# Patient Record
Sex: FEMALE | Race: BLACK OR AFRICAN AMERICAN | NOT HISPANIC OR LATINO
[De-identification: names, ages, dates, MRNs, and addresses within clinical notes are randomized per-mention and may not be internally consistent; named-entity substitution may affect disease eponyms.]

---

## 2019-01-09 ENCOUNTER — APPOINTMENT (OUTPATIENT)
Dept: ANTEPARTUM | Facility: CLINIC | Age: 24
End: 2019-01-09
Payer: COMMERCIAL

## 2019-01-09 ENCOUNTER — ASOB RESULT (OUTPATIENT)
Age: 24
End: 2019-01-09

## 2019-01-09 PROCEDURE — 76817 TRANSVAGINAL US OBSTETRIC: CPT

## 2019-02-13 ENCOUNTER — APPOINTMENT (OUTPATIENT)
Age: 24
End: 2019-02-13
Payer: COMMERCIAL

## 2019-02-13 ENCOUNTER — ASOB RESULT (OUTPATIENT)
Age: 24
End: 2019-02-13

## 2019-02-13 PROCEDURE — 76813 OB US NUCHAL MEAS 1 GEST: CPT

## 2019-04-10 ENCOUNTER — APPOINTMENT (OUTPATIENT)
Age: 24
End: 2019-04-10

## 2019-04-11 ENCOUNTER — APPOINTMENT (OUTPATIENT)
Age: 24
End: 2019-04-11
Payer: COMMERCIAL

## 2019-04-11 ENCOUNTER — ASOB RESULT (OUTPATIENT)
Age: 24
End: 2019-04-11

## 2019-04-11 PROCEDURE — 76817 TRANSVAGINAL US OBSTETRIC: CPT

## 2019-04-11 PROCEDURE — 76805 OB US >/= 14 WKS SNGL FETUS: CPT

## 2019-04-23 ENCOUNTER — ASOB RESULT (OUTPATIENT)
Age: 24
End: 2019-04-23

## 2019-04-23 ENCOUNTER — APPOINTMENT (OUTPATIENT)
Age: 24
End: 2019-04-23
Payer: COMMERCIAL

## 2019-04-23 PROCEDURE — 76816 OB US FOLLOW-UP PER FETUS: CPT

## 2019-05-28 ENCOUNTER — OUTPATIENT (OUTPATIENT)
Dept: INPATIENT UNIT | Facility: HOSPITAL | Age: 24
LOS: 1 days | End: 2019-05-28
Payer: COMMERCIAL

## 2019-05-28 VITALS
HEART RATE: 81 BPM | SYSTOLIC BLOOD PRESSURE: 111 MMHG | RESPIRATION RATE: 16 BRPM | DIASTOLIC BLOOD PRESSURE: 65 MMHG | TEMPERATURE: 98 F

## 2019-05-28 DIAGNOSIS — O47.02 FALSE LABOR BEFORE 37 COMPLETED WEEKS OF GESTATION, SECOND TRIMESTER: ICD-10-CM

## 2019-05-28 PROCEDURE — 59050 FETAL MONITOR W/REPORT: CPT

## 2019-05-28 PROCEDURE — 59025 FETAL NON-STRESS TEST: CPT

## 2019-05-28 PROCEDURE — G0463: CPT

## 2019-05-28 NOTE — OB RN TRIAGE NOTE - CHIEF COMPLAINT QUOTE
c/o abdominal pain that started yesterday and today the pain was low abdominal, pain is constant per pt

## 2019-05-28 NOTE — OB PROVIDER TRIAGE NOTE - NSOBPROVIDERNOTE_OBGYN_ALL_OB_FT
G1 here at 26 w here for likely round ligament pain. FHT reassuring for 26 wks GA. Plan to discharge home and keep next appointment with Dr Bahena

## 2019-05-28 NOTE — OB PROVIDER TRIAGE NOTE - HISTORY OF PRESENT ILLNESS
23 yo G1 here at 36 w 6 d here for abdominal pain that started yesterday. Reports pain as constant and radiating to vagina on left side. Pain does not come and go, does not feel like cramping/ contraction. Reports that pain today is better than it was yesterday. + FM, no LOF, VB or increased discharge. Denies any complications during this pregnancy.     ICU Vital Signs Last 24 Hrs  T(C): 36.7 (28 May 2019 14:46), Max: 36.7 (28 May 2019 14:46)  T(F): 98.1 (28 May 2019 14:46), Max: 98.1 (28 May 2019 14:46)  HR: 81 (28 May 2019 14:46) (81 - 81)  BP: 111/65 (28 May 2019 14:46) (111/65 - 111/65)      Gen: comfortable appearing, NAD  Abd: soft, non-tender, no contractions palpated     FHT: 150/ mod va/+ 10x10 accel  [place]: no contraction

## 2019-07-02 ENCOUNTER — ASOB RESULT (OUTPATIENT)
Age: 24
End: 2019-07-02

## 2019-07-02 ENCOUNTER — APPOINTMENT (OUTPATIENT)
Dept: ANTEPARTUM | Facility: CLINIC | Age: 24
End: 2019-07-02
Payer: COMMERCIAL

## 2019-07-02 PROCEDURE — 76816 OB US FOLLOW-UP PER FETUS: CPT

## 2019-08-27 ENCOUNTER — APPOINTMENT (OUTPATIENT)
Dept: ANTEPARTUM | Facility: CLINIC | Age: 24
End: 2019-08-27
Payer: COMMERCIAL

## 2019-08-27 ENCOUNTER — ASOB RESULT (OUTPATIENT)
Age: 24
End: 2019-08-27

## 2019-08-27 PROCEDURE — 76819 FETAL BIOPHYS PROFIL W/O NST: CPT

## 2019-08-27 PROCEDURE — 76816 OB US FOLLOW-UP PER FETUS: CPT

## 2019-08-28 ENCOUNTER — INPATIENT (INPATIENT)
Facility: HOSPITAL | Age: 24
LOS: 1 days | Discharge: HOME | End: 2019-08-30
Attending: SPECIALIST | Admitting: SPECIALIST
Payer: COMMERCIAL

## 2019-08-28 VITALS
HEART RATE: 73 BPM | DIASTOLIC BLOOD PRESSURE: 77 MMHG | SYSTOLIC BLOOD PRESSURE: 124 MMHG | HEIGHT: 66 IN | WEIGHT: 205.03 LBS | TEMPERATURE: 98 F | RESPIRATION RATE: 18 BRPM

## 2019-08-28 DIAGNOSIS — O26.893 OTHER SPECIFIED PREGNANCY RELATED CONDITIONS, THIRD TRIMESTER: ICD-10-CM

## 2019-08-28 LAB
ABO RH CONFIRMATION: SIGNIFICANT CHANGE UP
APPEARANCE UR: CLEAR — SIGNIFICANT CHANGE UP
BACTERIA # UR AUTO: ABNORMAL
BASOPHILS # BLD AUTO: 0.02 K/UL — SIGNIFICANT CHANGE UP (ref 0–0.2)
BASOPHILS NFR BLD AUTO: 0.2 % — SIGNIFICANT CHANGE UP (ref 0–2)
BILIRUB UR-MCNC: NEGATIVE — SIGNIFICANT CHANGE UP
BLD GP AB SCN SERPL QL: SIGNIFICANT CHANGE UP
COLOR SPEC: YELLOW — SIGNIFICANT CHANGE UP
COMMENT - URINE: SIGNIFICANT CHANGE UP
DIFF PNL FLD: NEGATIVE — SIGNIFICANT CHANGE UP
EOSINOPHIL # BLD AUTO: 0.09 K/UL — SIGNIFICANT CHANGE UP (ref 0–0.5)
EOSINOPHIL NFR BLD AUTO: 1 % — SIGNIFICANT CHANGE UP (ref 0–6)
EPI CELLS # UR: ABNORMAL
GLUCOSE UR QL: NEGATIVE MG/DL — SIGNIFICANT CHANGE UP
HCT VFR BLD CALC: 32.7 % — LOW (ref 34.5–45)
HGB BLD-MCNC: 11.1 G/DL — LOW (ref 11.5–15.5)
IMM GRANULOCYTES NFR BLD AUTO: 0.9 % — SIGNIFICANT CHANGE UP (ref 0–1.5)
KETONES UR-MCNC: NEGATIVE — SIGNIFICANT CHANGE UP
LEUKOCYTE ESTERASE UR-ACNC: ABNORMAL
LYMPHOCYTES # BLD AUTO: 1.3 K/UL — SIGNIFICANT CHANGE UP (ref 1–3.3)
LYMPHOCYTES # BLD AUTO: 14.8 % — SIGNIFICANT CHANGE UP (ref 13–44)
MCHC RBC-ENTMCNC: 31.1 PG — SIGNIFICANT CHANGE UP (ref 27–34)
MCHC RBC-ENTMCNC: 33.9 GM/DL — SIGNIFICANT CHANGE UP (ref 32–36)
MCV RBC AUTO: 91.6 FL — SIGNIFICANT CHANGE UP (ref 80–100)
MONOCYTES # BLD AUTO: 0.52 K/UL — SIGNIFICANT CHANGE UP (ref 0–0.9)
MONOCYTES NFR BLD AUTO: 5.9 % — SIGNIFICANT CHANGE UP (ref 2–14)
NEUTROPHILS # BLD AUTO: 6.75 K/UL — SIGNIFICANT CHANGE UP (ref 1.8–7.4)
NEUTROPHILS NFR BLD AUTO: 77.2 % — HIGH (ref 43–77)
NITRITE UR-MCNC: NEGATIVE — SIGNIFICANT CHANGE UP
PH UR: 6.5 — SIGNIFICANT CHANGE UP (ref 5–8)
PLATELET # BLD AUTO: 180 K/UL — SIGNIFICANT CHANGE UP (ref 150–400)
PROT UR-MCNC: NEGATIVE MG/DL — SIGNIFICANT CHANGE UP
RBC # BLD: 3.57 M/UL — LOW (ref 3.8–5.2)
RBC # FLD: 13 % — SIGNIFICANT CHANGE UP (ref 10.3–14.5)
SP GR SPEC: 1 — LOW (ref 1.01–1.02)
T PALLIDUM AB TITR SER: NEGATIVE — SIGNIFICANT CHANGE UP
UROBILINOGEN FLD QL: 1 MG/DL
WBC # BLD: 8.76 K/UL — SIGNIFICANT CHANGE UP (ref 3.8–10.5)
WBC # FLD AUTO: 8.76 K/UL — SIGNIFICANT CHANGE UP (ref 3.8–10.5)
WBC UR QL: SIGNIFICANT CHANGE UP /HPF (ref 0–5)

## 2019-08-28 RX ORDER — CITRIC ACID/SODIUM CITRATE 300-500 MG
30 SOLUTION, ORAL ORAL ONCE
Refills: 0 | Status: COMPLETED | OUTPATIENT
Start: 2019-08-28 | End: 2019-08-28

## 2019-08-28 RX ORDER — CITRIC ACID/SODIUM CITRATE 300-500 MG
15 SOLUTION, ORAL ORAL EVERY 6 HOURS
Refills: 0 | Status: DISCONTINUED | OUTPATIENT
Start: 2019-08-28 | End: 2019-08-28

## 2019-08-28 RX ORDER — SODIUM CHLORIDE 9 MG/ML
1000 INJECTION, SOLUTION INTRAVENOUS ONCE
Refills: 0 | Status: COMPLETED | OUTPATIENT
Start: 2019-08-28 | End: 2019-08-28

## 2019-08-28 RX ORDER — OXYCODONE HYDROCHLORIDE 5 MG/1
5 TABLET ORAL ONCE
Refills: 0 | Status: DISCONTINUED | OUTPATIENT
Start: 2019-08-28 | End: 2019-08-30

## 2019-08-28 RX ORDER — OXYCODONE HYDROCHLORIDE 5 MG/1
5 TABLET ORAL
Refills: 0 | Status: DISCONTINUED | OUTPATIENT
Start: 2019-08-28 | End: 2019-08-30

## 2019-08-28 RX ORDER — ACETAMINOPHEN 500 MG
975 TABLET ORAL
Refills: 0 | Status: DISCONTINUED | OUTPATIENT
Start: 2019-08-28 | End: 2019-08-30

## 2019-08-28 RX ORDER — OXYTOCIN 10 UNIT/ML
333.33 VIAL (ML) INJECTION
Qty: 20 | Refills: 0 | Status: DISCONTINUED | OUTPATIENT
Start: 2019-08-28 | End: 2019-08-30

## 2019-08-28 RX ORDER — SODIUM CHLORIDE 9 MG/ML
3 INJECTION INTRAMUSCULAR; INTRAVENOUS; SUBCUTANEOUS EVERY 8 HOURS
Refills: 0 | Status: DISCONTINUED | OUTPATIENT
Start: 2019-08-28 | End: 2019-08-30

## 2019-08-28 RX ORDER — OXYTOCIN 10 UNIT/ML
333.33 VIAL (ML) INJECTION
Qty: 20 | Refills: 0 | Status: COMPLETED | OUTPATIENT
Start: 2019-08-28 | End: 2019-08-28

## 2019-08-28 RX ORDER — MAGNESIUM HYDROXIDE 400 MG/1
30 TABLET, CHEWABLE ORAL
Refills: 0 | Status: DISCONTINUED | OUTPATIENT
Start: 2019-08-28 | End: 2019-08-30

## 2019-08-28 RX ORDER — KETOROLAC TROMETHAMINE 30 MG/ML
30 SYRINGE (ML) INJECTION ONCE
Refills: 0 | Status: DISCONTINUED | OUTPATIENT
Start: 2019-08-28 | End: 2019-08-28

## 2019-08-28 RX ORDER — SIMETHICONE 80 MG/1
80 TABLET, CHEWABLE ORAL EVERY 4 HOURS
Refills: 0 | Status: DISCONTINUED | OUTPATIENT
Start: 2019-08-28 | End: 2019-08-30

## 2019-08-28 RX ORDER — IBUPROFEN 200 MG
600 TABLET ORAL EVERY 6 HOURS
Refills: 0 | Status: COMPLETED | OUTPATIENT
Start: 2019-08-28 | End: 2020-07-26

## 2019-08-28 RX ORDER — BENZOCAINE 10 %
1 GEL (GRAM) MUCOUS MEMBRANE EVERY 6 HOURS
Refills: 0 | Status: DISCONTINUED | OUTPATIENT
Start: 2019-08-28 | End: 2019-08-30

## 2019-08-28 RX ORDER — SODIUM CHLORIDE 9 MG/ML
1000 INJECTION, SOLUTION INTRAVENOUS
Refills: 0 | Status: DISCONTINUED | OUTPATIENT
Start: 2019-08-28 | End: 2019-08-28

## 2019-08-28 RX ORDER — DOCUSATE SODIUM 100 MG
100 CAPSULE ORAL
Refills: 0 | Status: DISCONTINUED | OUTPATIENT
Start: 2019-08-28 | End: 2019-08-30

## 2019-08-28 RX ORDER — GLYCERIN ADULT
1 SUPPOSITORY, RECTAL RECTAL AT BEDTIME
Refills: 0 | Status: DISCONTINUED | OUTPATIENT
Start: 2019-08-28 | End: 2019-08-30

## 2019-08-28 RX ORDER — DIBUCAINE 1 %
1 OINTMENT (GRAM) RECTAL EVERY 6 HOURS
Refills: 0 | Status: DISCONTINUED | OUTPATIENT
Start: 2019-08-28 | End: 2019-08-30

## 2019-08-28 RX ORDER — LANOLIN
1 OINTMENT (GRAM) TOPICAL EVERY 6 HOURS
Refills: 0 | Status: DISCONTINUED | OUTPATIENT
Start: 2019-08-28 | End: 2019-08-30

## 2019-08-28 RX ORDER — TETANUS TOXOID, REDUCED DIPHTHERIA TOXOID AND ACELLULAR PERTUSSIS VACCINE, ADSORBED 5; 2.5; 8; 8; 2.5 [IU]/.5ML; [IU]/.5ML; UG/.5ML; UG/.5ML; UG/.5ML
0.5 SUSPENSION INTRAMUSCULAR ONCE
Refills: 0 | Status: COMPLETED | OUTPATIENT
Start: 2019-08-28

## 2019-08-28 RX ORDER — OXYTOCIN 10 UNIT/ML
2 VIAL (ML) INJECTION
Qty: 30 | Refills: 0 | Status: DISCONTINUED | OUTPATIENT
Start: 2019-08-28 | End: 2019-08-28

## 2019-08-28 RX ORDER — DIPHENHYDRAMINE HCL 50 MG
25 CAPSULE ORAL EVERY 6 HOURS
Refills: 0 | Status: DISCONTINUED | OUTPATIENT
Start: 2019-08-28 | End: 2019-08-30

## 2019-08-28 RX ORDER — AER TRAVELER 0.5 G/1
1 SOLUTION RECTAL; TOPICAL EVERY 4 HOURS
Refills: 0 | Status: DISCONTINUED | OUTPATIENT
Start: 2019-08-28 | End: 2019-08-30

## 2019-08-28 RX ORDER — PRAMOXINE HYDROCHLORIDE 150 MG/15G
1 AEROSOL, FOAM RECTAL EVERY 4 HOURS
Refills: 0 | Status: DISCONTINUED | OUTPATIENT
Start: 2019-08-28 | End: 2019-08-30

## 2019-08-28 RX ORDER — IBUPROFEN 200 MG
600 TABLET ORAL EVERY 6 HOURS
Refills: 0 | Status: DISCONTINUED | OUTPATIENT
Start: 2019-08-28 | End: 2019-08-30

## 2019-08-28 RX ORDER — HYDROCORTISONE 1 %
1 OINTMENT (GRAM) TOPICAL EVERY 6 HOURS
Refills: 0 | Status: DISCONTINUED | OUTPATIENT
Start: 2019-08-28 | End: 2019-08-30

## 2019-08-28 RX ADMIN — Medication 1 ENEMA: at 10:00

## 2019-08-28 RX ADMIN — Medication 975 MILLIGRAM(S): at 21:45

## 2019-08-28 RX ADMIN — SODIUM CHLORIDE 3 MILLILITER(S): 9 INJECTION INTRAMUSCULAR; INTRAVENOUS; SUBCUTANEOUS at 22:15

## 2019-08-28 RX ADMIN — SODIUM CHLORIDE 3000 MILLILITER(S): 9 INJECTION, SOLUTION INTRAVENOUS at 14:59

## 2019-08-28 RX ADMIN — Medication 0.2 MILLIGRAM(S): at 17:04

## 2019-08-28 RX ADMIN — SODIUM CHLORIDE 125 MILLILITER(S): 9 INJECTION, SOLUTION INTRAVENOUS at 10:40

## 2019-08-28 RX ADMIN — Medication 30 MILLILITER(S): at 14:26

## 2019-08-28 RX ADMIN — SODIUM CHLORIDE 125 MILLILITER(S): 9 INJECTION, SOLUTION INTRAVENOUS at 11:52

## 2019-08-28 RX ADMIN — Medication 2 MILLIUNIT(S)/MIN: at 11:52

## 2019-08-28 RX ADMIN — Medication 2 MILLIUNIT(S)/MIN: at 11:05

## 2019-08-28 RX ADMIN — Medication 30 MILLIGRAM(S): at 18:35

## 2019-08-28 RX ADMIN — Medication 975 MILLIGRAM(S): at 20:55

## 2019-08-28 RX ADMIN — Medication 1000 MILLIUNIT(S)/MIN: at 17:16

## 2019-08-28 RX ADMIN — Medication 30 MILLIGRAM(S): at 18:21

## 2019-08-28 NOTE — CHART NOTE - NSCHARTNOTEFT_GEN_A_CORE
Patient evaluated at bedside.   Her cervix was checked and found to be 5/80/-2 with bulging bag.    Vital Signs Last 24 Hrs  T(C): 36.5 (28 Aug 2019 14:15), Max: 36.8 (28 Aug 2019 09:37)  T(F): 97.7 (28 Aug 2019 14:15), Max: 98.24 (28 Aug 2019 09:40)  HR: 77 (28 Aug 2019 16:10) (64 - 90)  BP: 114/73 (28 Aug 2019 16:10) (114/73 - 147/83)  RR: 20 (28 Aug 2019 15:00) (16 - 20)  SpO2: 96% (28 Aug 2019 16:10) (93% - 98%)    FHT: 130bpm - category I tracing  [place]: Ctxs every 1-2 min    Patient AROM at 4:10, FSE placed, patient on the peanut ball.  After rupture her exam was 8/100/0.    Dr. Bahena present for the encounter.

## 2019-08-28 NOTE — OB PROVIDER IHI INDUCTION/AUGMENTATION NOTE - NS_CHECKALL_OBGYN_ALL_OB
Contractions pattern was reviewed/FHR was reviewed/Order was written/H&P was completed/Induction / Augmentation was discussed

## 2019-08-28 NOTE — OB PROVIDER H&P - HISTORY OF PRESENT ILLNESS
25 yo  at 40w presents to L&D for IOL for LGA. pt has no acute complaints. +FM, -VB, -LOF  Andres: 19; LMP: 18  Prenatal course: uncomplicated    EFW: 3800 (8lbs 6oz)    PMH: none  Med: none  Allergies: Cipro - anaphylaxis  Psurg: none  POB: none  Psurg: none    GBS neg  HIV NR  RPR NR  Rubella: immune  HepB: NR  O+

## 2019-08-28 NOTE — OB RN DELIVERY SUMMARY - NS_SEPSISRSKCALC_OBGYN_ALL_OB_FT
EOS calculated successfully. EOS Risk Factor: 0.5/1000 live births ([facility][note 3] national incidence); GA=40w;Temp=98.24; ROM=0.9; GBS='Negative'; Antibiotics='No antibiotics or any antibiotics < 2 hrs prior to birth'

## 2019-08-28 NOTE — OB PROVIDER H&P - ASSESSMENT
25 yo  at 40w presents to L&D for IOL for LGA. EFW 3800gm    - admit to L&D for IOL on pitocin  - consent obtained  - fleet enema  - labs ordered

## 2019-08-28 NOTE — OB PROVIDER H&P - NSHPPHYSICALEXAM_GEN_ALL_CORE
Vital Signs Last 24 Hrs  T(C): 36.8 (28 Aug 2019 09:37), Max: 36.8 (28 Aug 2019 09:37)  T(F): 98.2 (28 Aug 2019 09:37), Max: 98.2 (28 Aug 2019 09:37)  HR: 73 (28 Aug 2019 09:41) (70 - 73)  BP: 124/77 (28 Aug 2019 09:41) (116/72 - 124/77)  RR: 18 (28 Aug 2019 09:37) (16 - 18)    Gen: lying comfortably in bed  Abd: +gravid  Pelvic: 3/80/-2  FHT: +130bpm, +accels, reactive  [place]:  U/s: Vital Signs Last 24 Hrs  T(C): 36.8 (28 Aug 2019 09:37), Max: 36.8 (28 Aug 2019 09:37)  T(F): 98.2 (28 Aug 2019 09:37), Max: 98.2 (28 Aug 2019 09:37)  HR: 73 (28 Aug 2019 09:41) (70 - 73)  BP: 124/77 (28 Aug 2019 09:41) (116/72 - 124/77)  RR: 18 (28 Aug 2019 09:37) (16 - 18)    Gen: lying comfortably in bed  Abd: +gravid  Pelvic: 3/80/-2  FHT: +130bpm, +accels, reactive  [place]: -ctx  U/s: vertex, Lt fundal placenta

## 2019-08-28 NOTE — OB PROVIDER DELIVERY SUMMARY - NSPROVIDERDELIVERYNOTE_OBGYN_ALL_OB_FT
Admitted in labor . Paitent history of LGA. Epidural  anesthesia for labor and pitocin augmentation progressed to deliver  live baby boy Apgar 9/9 with compound presentation left hand. Midline epistiotomy repaired in layers. Placenta spontaneous complete. Blood loss 300 cc.

## 2019-08-29 ENCOUNTER — TRANSCRIPTION ENCOUNTER (OUTPATIENT)
Age: 24
End: 2019-08-29

## 2019-08-29 LAB
HCT VFR BLD CALC: 31.9 % — LOW (ref 34.5–45)
HGB BLD-MCNC: 10.4 G/DL — LOW (ref 11.5–15.5)

## 2019-08-29 RX ORDER — TETANUS TOXOID, REDUCED DIPHTHERIA TOXOID AND ACELLULAR PERTUSSIS VACCINE, ADSORBED 5; 2.5; 8; 8; 2.5 [IU]/.5ML; [IU]/.5ML; UG/.5ML; UG/.5ML; UG/.5ML
0.5 SUSPENSION INTRAMUSCULAR ONCE
Refills: 0 | Status: COMPLETED | OUTPATIENT
Start: 2019-08-29 | End: 2019-08-29

## 2019-08-29 RX ADMIN — Medication 600 MILLIGRAM(S): at 17:08

## 2019-08-29 RX ADMIN — SODIUM CHLORIDE 3 MILLILITER(S): 9 INJECTION INTRAMUSCULAR; INTRAVENOUS; SUBCUTANEOUS at 07:06

## 2019-08-29 RX ADMIN — Medication 600 MILLIGRAM(S): at 07:02

## 2019-08-29 RX ADMIN — Medication 600 MILLIGRAM(S): at 01:15

## 2019-08-29 RX ADMIN — Medication 975 MILLIGRAM(S): at 22:00

## 2019-08-29 RX ADMIN — Medication 1 TABLET(S): at 13:29

## 2019-08-29 RX ADMIN — TETANUS TOXOID, REDUCED DIPHTHERIA TOXOID AND ACELLULAR PERTUSSIS VACCINE, ADSORBED 0.5 MILLILITER(S): 5; 2.5; 8; 8; 2.5 SUSPENSION INTRAMUSCULAR at 23:46

## 2019-08-29 RX ADMIN — Medication 600 MILLIGRAM(S): at 18:00

## 2019-08-29 RX ADMIN — Medication 600 MILLIGRAM(S): at 00:14

## 2019-08-29 RX ADMIN — Medication 600 MILLIGRAM(S): at 07:45

## 2019-08-29 RX ADMIN — Medication 600 MILLIGRAM(S): at 14:20

## 2019-08-29 RX ADMIN — Medication 975 MILLIGRAM(S): at 21:08

## 2019-08-29 RX ADMIN — Medication 600 MILLIGRAM(S): at 13:28

## 2019-08-29 NOTE — PROGRESS NOTE ADULT - ASSESSMENT
Patient is a 24y woman  ; PPD# 1  -Encourage ambulation  -Regular diet as tolerated  -Continue with multimodal pain management   -Postpartum care  -re-evaluate tomorrow

## 2019-08-29 NOTE — PROGRESS NOTE ADULT - SUBJECTIVE AND OBJECTIVE BOX
Patient is a 24y woman  ; PPD# 1    Subjective:  - The patient seen and examined at bedside. No acute overnight events.   - She feels well, pain is well controlled at this time.   - She is ambulating and tolerating a diet.   - +Flatus, - BM. Patient is voiding without difficulty.   - She denies nausea/vomiting, breathing problems, headache and visual changes.  - Lochia wnl.  - Breastfeeding without difficulty.    Vital Signs Last 24 Hrs  T(C): 36.7 (29 Aug 2019 00:00), Max: 38 (28 Aug 2019 17:23)  T(F): 98 (29 Aug 2019 00:00), Max: 100.4 (28 Aug 2019 17:23)  HR: 62 (29 Aug 2019 00:00) (62 - 93)  BP: 114/71 (29 Aug 2019 00:00) (114/71 - 148/82)  RR: 18 (29 Aug 2019 00:00) (18 - 20)  SpO2: 97% (29 Aug 2019 00:00) (87% - 99%)    Physical exam:  General: NAD. Appears well.  Cardio: RRR  No increased work of breathing. Lungs CTA b/l  Abdomen: soft, nontender, nondistended, firm uterine fundus.  Pelvic: Normal lochia noted.  Ext: No DVT signs, warm extremities, no edema.    Allergies    Cipro (Short breath)    Intolerances        LABS:                        11.1   8.76  )-----------( 180      ( 28 Aug 2019 11:17 )             32.7

## 2019-08-29 NOTE — PROGRESS NOTE ADULT - SUBJECTIVE AND OBJECTIVE BOX
S: Patient doing well. Minimal lochia. No complaints.    O: Vital Signs Last 24 Hrs  T(C): 36.7 (29 Aug 2019 00:00), Max: 38 (28 Aug 2019 17:23)  T(F): 98 (29 Aug 2019 00:00), Max: 100.4 (28 Aug 2019 17:23)  HR: 62 (29 Aug 2019 00:00) (62 - 93)  BP: 114/71 (29 Aug 2019 00:00) (114/71 - 148/82)  BP(mean): --  RR: 18 (29 Aug 2019 00:00) (18 - 20)  SpO2: 97% (29 Aug 2019 00:00) (87% - 99%)    Gen: NAD  Abd: soft, NT, ND, fundus firm below umbilicus  Ext: no tenderness    Labs:                        11.1   8.76  )-----------( 180      ( 28 Aug 2019 11:17 )             32.7     Antibody Screen: NEG (08-28 @ 11:23)       A/P PP # 1  Doing well.  Routine post partum care.  Discharge home in AM.

## 2019-08-30 VITALS
TEMPERATURE: 99 F | DIASTOLIC BLOOD PRESSURE: 83 MMHG | OXYGEN SATURATION: 97 % | RESPIRATION RATE: 18 BRPM | HEART RATE: 59 BPM | SYSTOLIC BLOOD PRESSURE: 129 MMHG

## 2019-08-30 PROCEDURE — 36415 COLL VENOUS BLD VENIPUNCTURE: CPT

## 2019-08-30 PROCEDURE — 86901 BLOOD TYPING SEROLOGIC RH(D): CPT

## 2019-08-30 PROCEDURE — 90715 TDAP VACCINE 7 YRS/> IM: CPT

## 2019-08-30 PROCEDURE — 86900 BLOOD TYPING SEROLOGIC ABO: CPT

## 2019-08-30 PROCEDURE — 81001 URINALYSIS AUTO W/SCOPE: CPT

## 2019-08-30 PROCEDURE — 85018 HEMOGLOBIN: CPT

## 2019-08-30 PROCEDURE — 85014 HEMATOCRIT: CPT

## 2019-08-30 PROCEDURE — 85027 COMPLETE CBC AUTOMATED: CPT

## 2019-08-30 PROCEDURE — 76815 OB US LIMITED FETUS(S): CPT

## 2019-08-30 PROCEDURE — 90707 MMR VACCINE SC: CPT

## 2019-08-30 PROCEDURE — 86780 TREPONEMA PALLIDUM: CPT

## 2019-08-30 PROCEDURE — 86850 RBC ANTIBODY SCREEN: CPT

## 2019-08-30 PROCEDURE — 59025 FETAL NON-STRESS TEST: CPT

## 2019-08-30 PROCEDURE — 59050 FETAL MONITOR W/REPORT: CPT

## 2019-08-30 RX ORDER — IBUPROFEN 200 MG
1 TABLET ORAL
Qty: 20 | Refills: 0
Start: 2019-08-30

## 2019-08-30 RX ORDER — FERROUS SULFATE 325(65) MG
1 TABLET ORAL
Qty: 0 | Refills: 0 | DISCHARGE

## 2019-08-30 RX ORDER — DOCUSATE SODIUM 100 MG
1 CAPSULE ORAL
Qty: 28 | Refills: 0
Start: 2019-08-30 | End: 2019-09-12

## 2019-08-30 RX ADMIN — Medication 600 MILLIGRAM(S): at 05:46

## 2019-08-30 RX ADMIN — Medication 600 MILLIGRAM(S): at 14:30

## 2019-08-30 RX ADMIN — Medication 975 MILLIGRAM(S): at 09:35

## 2019-08-30 RX ADMIN — Medication 975 MILLIGRAM(S): at 10:30

## 2019-08-30 RX ADMIN — Medication 600 MILLIGRAM(S): at 13:48

## 2019-08-30 RX ADMIN — Medication 600 MILLIGRAM(S): at 06:36

## 2019-08-30 RX ADMIN — Medication 975 MILLIGRAM(S): at 04:00

## 2019-08-30 RX ADMIN — Medication 975 MILLIGRAM(S): at 03:10

## 2019-08-30 RX ADMIN — Medication 1 TABLET(S): at 13:48

## 2019-08-30 RX ADMIN — Medication 600 MILLIGRAM(S): at 01:30

## 2019-08-30 RX ADMIN — Medication 600 MILLIGRAM(S): at 00:28

## 2019-08-30 RX ADMIN — Medication 0.5 MILLILITER(S): at 15:31

## 2019-08-30 NOTE — DISCHARGE NOTE OB - HOSPITAL COURSE
She is a yo now G1)1001 who presented at 40 wks GA for induction of labor and had a normal delivery with episiotomy. She had a normal postpartum course and was discharged home in stable condition on postpartum day 2. She is a 23yo now  who presented at 40wks GA for induction of labor and had a normal delivery with episiotomy. She had a normal postpartum course and was discharged home in stable condition on postpartum day 2.

## 2019-08-30 NOTE — DISCHARGE NOTE OB - MEDICATION SUMMARY - MEDICATIONS TO TAKE
I will START or STAY ON the medications listed below when I get home from the hospital:    ibuprofen 600 mg oral tablet  -- 1 tab(s) by mouth every 6 hours  -- Indication: For pain    Gus-Sequels (as elemental iron) 65 mg-25 mg oral tablet  -- 1 tab(s) by mouth once a day   -- Check with your doctor before becoming pregnant.  Do not take dairy products, antacids, or iron preparations within one hour of this medication.  May discolor urine or feces.  Obtain medical advice before taking any non-prescription drugs as some may affect the action of this medication.    -- Indication: For blood loss    docusate sodium 100 mg oral capsule  -- 1 cap(s) by mouth 2 times a day, As needed, For stool softening  -- Indication: For  constipation

## 2019-08-30 NOTE — DISCHARGE NOTE OB - PATIENT PORTAL LINK FT
You can access the FollowMyHealth Patient Portal offered by [facility] by registering at the following website: [email or web address]. By joining [employer]’s FollowMyHealth portal, you will also be able to view your health information using other applications (apps) compatible with our system.

## 2019-08-30 NOTE — PROGRESS NOTE ADULT - ASSESSMENT
Patient is a 24y woman  ; PPD# 2  -D/C patient home  -Continue ambulation  -Regular diet as tolerated  -Continue multimodal pain management as needed.   -Follow up with [facility] in 4-6 weeks.

## 2019-08-30 NOTE — PROGRESS NOTE ADULT - SUBJECTIVE AND OBJECTIVE BOX
Patient is a 24y woman  ; PPD# 2    Subjective: Doing well over all, no complaints.   -  No acute overnight events.   - She feels well, pain is well controlled.   - She is ambulating and tolerating a regular diet.   - +Flatus, -BM. Patient is voiding without difficulty.   - She denies nausea/vomiting, breathing problems, headache,, dizziness and visual changes.  - Lochia wnl.  - Breastfeeding without difficulty.    Vital Signs Last 24 Hrs  T(C): 37 (29 Aug 2019 19:42), Max: 37 (29 Aug 2019 19:42)  T(F): 98.6 (29 Aug 2019 19:42), Max: 98.6 (29 Aug 2019 19:42)  HR: 60 (29 Aug 2019 19:42) (60 - 60)  BP: 136/82 (29 Aug 2019 19:42) (136/82 - 136/82)  RR: 18 (29 Aug 2019 19:42) (18 - 18)  SpO2: 96% (29 Aug 2019 19:42) (96% - 96%)    Physical exam:  General: NAD. Appears well.  Cardio: RRR  Pulm: CTABL, no increased work of breathing   Breast: not engorged, no erythema   Abdomen: soft, nontender, nondistended, firm uterine fundus.  Pelvic: Normal lochia noted.  Ext: No DVT signs, warm extremities, no edema.    Allergies    Cipro (Short breath)    Intolerances        LABS:                        10.4   x     )-----------( x        ( 29 Aug 2019 07:19 )             31.9

## 2019-08-30 NOTE — DISCHARGE NOTE OB - CARE PLAN
Principal Discharge DX:	 (normal spontaneous vaginal delivery)  Goal:	Rapid recovery  Assessment and plan of treatment:	Patient should transition to regular activity level. Resume regular diet. Patient should follow up with her OB for a postpartum checkup 4- 6 weeks after delivery. Patient should call her doctor sooner if she develops a fever or uncontrolled vaginal bleeding or fevers. Please call sooner if there are any other concerns.

## 2019-08-30 NOTE — DISCHARGE NOTE OB - MATERIALS PROVIDED
Shaken Baby Prevention Handout/Breastfeeding Guide and Packet/Vaccinations/Breastfeeding Mother’s Support Group Information/Guide to Postpartum Care/  Immunization Record/Breastfeeding Log/Back To Sleep Handout/Birth Certificate Instructions/[facility] [place] Screening Program/[facility] Hearing Screen Program

## 2019-08-30 NOTE — PROGRESS NOTE ADULT - SUBJECTIVE AND OBJECTIVE BOX
S: Patient doing well. Minimal lochia. No complaints.     O: Vital Signs Last 24 Hrs  T(C): 37 (30 Aug 2019 09:46), Max: 37 (29 Aug 2019 19:42)  T(F): 98.6 (30 Aug 2019 09:46), Max: 98.6 (29 Aug 2019 19:42)  HR: 59 (30 Aug 2019 09:46) (59 - 60)  BP: 129/83 (30 Aug 2019 09:46) (129/83 - 136/82)  BP(mean): --  RR: 18 (30 Aug 2019 09:46) (18 - 18)  SpO2: 97% (30 Aug 2019 09:46) (96% - 97%)    Gen: NAD  Breast :  Abd: soft, NT, ND, fundus firm below umbilicus  Ext: no tenderness    Labs:                        10.4   x     )-----------( x        ( 29 Aug 2019 07:19 )             31.9            PP # 2 s/p     Doing well.  Discharge home.  Nothing in vagina and no heavy lifting for 6 weeks.   Follow up 6 weeks for post partum visit.  Call office for any fevers, chills, heavy vaginal bleeding, symptoms of depression, or any other concerning symptoms.  Continue motrin 600 mg every 6 hours.

## 2019-08-30 NOTE — DISCHARGE NOTE OB - PLAN OF CARE
Rapid recovery Patient should transition to regular activity level. Resume regular diet. Patient should follow up with her OB for a postpartum checkup 4- 6 weeks after delivery. Patient should call her doctor sooner if she develops a fever or uncontrolled vaginal bleeding or fevers. Please call sooner if there are any other concerns.

## 2019-08-30 NOTE — DISCHARGE NOTE OB - CARE PROVIDER_API CALL
Sharda Bahena)  [facility] Obstetrics [facility] Gynecology  [facility] [place] [address]  Phone: [phone]  Fax: [phone]  Follow Up Time: Sharda Bahena)  [facility]etric[facility]ne[facility]  [facility] [place] [address]  Phone: [phone]  Fax: [phone]  Follow Up Time: 1 month

## 2019-10-31 ENCOUNTER — EMERGENCY (EMERGENCY)
Facility: HOSPITAL | Age: 24
LOS: 1 days | Discharge: HOME | End: 2019-10-31
Attending: EMERGENCY MEDICINE
Payer: COMMERCIAL

## 2019-10-31 VITALS
DIASTOLIC BLOOD PRESSURE: 78 MMHG | WEIGHT: 175.05 LBS | SYSTOLIC BLOOD PRESSURE: 125 MMHG | HEIGHT: 66 IN | HEART RATE: 72 BPM | TEMPERATURE: 98 F | RESPIRATION RATE: 18 BRPM | OXYGEN SATURATION: 97 %

## 2019-10-31 PROCEDURE — 73110 X-RAY EXAM OF WRIST: CPT | Mod: 26,LT

## 2019-10-31 PROCEDURE — 99284 EMERGENCY DEPT VISIT MOD MDM: CPT | Mod: 25

## 2019-10-31 PROCEDURE — 71046 X-RAY EXAM CHEST 2 VIEWS: CPT | Mod: 26

## 2019-10-31 PROCEDURE — 73110 X-RAY EXAM OF WRIST: CPT

## 2019-10-31 PROCEDURE — 71046 X-RAY EXAM CHEST 2 VIEWS: CPT

## 2019-10-31 PROCEDURE — 99283 EMERGENCY DEPT VISIT LOW MDM: CPT

## 2019-10-31 RX ORDER — LIDOCAINE 4 G/100G
1 CREAM TOPICAL ONCE
Refills: 0 | Status: COMPLETED | OUTPATIENT
Start: 2019-10-31 | End: 2019-10-31

## 2019-10-31 RX ORDER — ACETAMINOPHEN 500 MG
650 TABLET ORAL ONCE
Refills: 0 | Status: COMPLETED | OUTPATIENT
Start: 2019-10-31 | End: 2019-10-31

## 2019-10-31 RX ADMIN — LIDOCAINE 1 PATCH: 4 CREAM TOPICAL at 21:49

## 2019-10-31 RX ADMIN — Medication 650 MILLIGRAM(S): at 21:49
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
## 2019-10-31 NOTE — ED PROVIDER NOTE - CHPI ED SYMPTOMS NEG
no loss of consciousness/no laceration no bruising/no difficulty bearing weight/no decreased eating/drinking/no dizziness/no loss of consciousness/no disorientation/no laceration

## 2019-10-31 NOTE — ED PROVIDER NOTE - CARDIAC, MLM
Normal rate, regular rhythm.  Heart sounds S1, S2.  No murmurs, rubs or gallops; under 2 second cap refill; 2+ distal pulses bilaterally

## 2019-10-31 NOTE — ED PROVIDER NOTE - NS ED SCRIBE ACP NAME FT
Subjective:     Interval History: s/p ex lap with small bowel resection on 4/30/2018   No acute events overnight. PCA with discontinued yesterday and she complained of uncontrol pain during the day. Pain is now more manageable with increase in IV break through medication. Low residue diet tolerated with no nausea or vomiting. Adequate UOP. Mild tachycardia.  Dark, thin bilious output from ostomy (555 cc/24 hours)    Post-Op Info:  Procedure(s) (LRB):  EXPLORATORY-LAPAROTOMY, small bowel resection, possible ostomy (N/A)  RESECTION-SMALL BOWEL (N/A)  ILEOSTOMY (Left)   3 Days Post-Op      Medications:  Continuous Infusions:   glucagon (human recombinant)       Scheduled Meds:   enoxaparin  40 mg Subcutaneous Daily    ibuprofen  800 mg Intravenous Q8H     PRN Meds:   dextrose 50%    dextrose 50%    dextrose 50%    glucagon (human recombinant)    glucose    glucose    glucose    naloxone    ondansetron    oxyCODONE    oxyCODONE    promethazine (PHENERGAN) IVPB    simethicone        Objective:     Vital Signs (Most Recent):  Temp: 98.8 °F (37.1 °C) (05/03/18 0340)  Pulse: 105 (05/03/18 0340)  Resp: 18 (05/03/18 0340)  BP: (!) 117/58 (05/03/18 0340)  SpO2: 97 % (05/03/18 0340) Vital Signs (24h Range):  Temp:  [98.4 °F (36.9 °C)-99.6 °F (37.6 °C)] 98.8 °F (37.1 °C)  Pulse:  [] 105  Resp:  [16-32] 18  SpO2:  [94 %-97 %] 97 %  BP: (105-131)/(58-73) 117/58     Intake/Output - Last 3 Shifts       05/01 0700 - 05/02 0659 05/02 0700 - 05/03 0659 05/03 0700 - 05/04 0659    I.V. (mL/kg)  1000 (16.3)     IV Piggyback 750      Total Intake(mL/kg) 750 (12.3) 1000 (16.3)     Urine (mL/kg/hr) 825 (0.6) 1820 (1.2)     Stool 15 (0) 555 (0.4)     Total Output 840 2375      Net -90 -1375             Urine Occurrence  2 x           Physical Exam   Constitutional: She is oriented to person, place, and time. She appears well-developed. No distress.   HENT:   Head: Normocephalic and atraumatic.   Neck: Normal range of  motion. Neck supple.   Cardiovascular: Normal rate and regular rhythm.    Pulmonary/Chest: Effort normal. No respiratory distress.   Abdominal: Soft. She exhibits no distension. There is tenderness (mild).   Midline abdominal incision c/d/i. Ostomy pink and patent with bilious ostomy output   Musculoskeletal: Normal range of motion.   Neurological: She is alert and oriented to person, place, and time.   Skin: Skin is warm and dry. She is not diaphoretic.   Nursing note and vitals reviewed.      Significant Labs:  BMP:     Recent Labs  Lab 05/02/18  0439   GLU 82   *   K 3.9      CO2 22*   BUN 7   CREATININE 0.7   CALCIUM 8.6*   MG 1.4*     CBC:     Recent Labs  Lab 05/02/18  0439   WBC 8.44   RBC 3.25*   HGB 9.7*   HCT 29.6*      MCV 91   MCH 29.8   MCHC 32.8     CMP:   Recent Labs  Lab 04/30/18  1254  05/02/18  0439   GLU 87  < > 82   CALCIUM 10.2  < > 8.6*   ALBUMIN 3.8  --   --    PROT 8.2  --   --      < > 133*   K 4.4  < > 3.9   CO2 18*  < > 22*     < > 104   BUN 17  < > 7   CREATININE 1.3  < > 0.7   ALKPHOS 174*  --   --    ALT 27  --   --    AST 25  --   --    BILITOT 1.1*  --   --    < > = values in this interval not displayed.  CRP:     Recent Labs  Lab 04/26/18  1048   CRP 1.52*     LFTs:     Recent Labs  Lab 04/30/18  1254   ALT 27   AST 25   ALKPHOS 174*   BILITOT 1.1*   PROT 8.2   ALBUMIN 3.8        John Paul Deal)
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
## 2019-10-31 NOTE — ED PROVIDER NOTE - PATIENT PORTAL LINK FT
You can access the FollowMyHealth Patient Portal offered by [facility] by registering at the following website: [email or web address]. By joining [employer]’s FollowMyHealth portal, you will also be able to view your health information using other applications (apps) compatible with our system.

## 2019-10-31 NOTE — ED PROVIDER NOTE - MUSCULOSKELETAL, MLM
No midline cervical spinal ttp, ttp to lumbar paraspinal left and right; left shoulder ttp; ttp to midsternum; ttp to distal radius and ulnda No midline cervical spinal ttp, ttp to lumbar paraspinal left and right; left cervical paraspinal m ttp; ttp to midsternum; ttp to distal radius and ulnda

## 2019-10-31 NOTE — ED PROVIDER NOTE - NEUROLOGICAL, MLM
AAOx3, follows commands; negative nystagmus, negative pronator drift; CN 2-12 intact; sensation and strength are normal, normal gait; +2 brachioradialis reflex, +2 knee jerk reflex

## 2019-10-31 NOTE — ED PROVIDER NOTE - PROGRESS NOTE DETAILS
PA NOTE: no fractures noted on xray, Pt reassessed, pt feeling better at this time, pain has improved, vss, pt able to walk, talk and vocalized plan of action. Discussed in depth the results and findings that were performed in the ED and explained to pt in depth the next steps that need to be taking including any medications and proper follow up with PCP or specialists. Pt verbalized their concerns and all questions were answered. Pt understands dispo and agrees with discharge.

## 2020-02-27 NOTE — DISCHARGE NOTE OB - AVOID TUB BATHING UNTIL YOUR POSTPARTUM VISIT
Pt had elevated ck on admission   It trended down  Was given fluids which helped  resolved Statement Selected

## 2020-05-23 ENCOUNTER — EMERGENCY (EMERGENCY)
Facility: HOSPITAL | Age: 25
LOS: 1 days | Discharge: HOME | End: 2020-05-23
Attending: EMERGENCY MEDICINE
Payer: MEDICAID

## 2020-05-23 VITALS
RESPIRATION RATE: 16 BRPM | HEIGHT: 66 IN | TEMPERATURE: 98 F | HEART RATE: 84 BPM | WEIGHT: 169.98 LBS | SYSTOLIC BLOOD PRESSURE: 129 MMHG | DIASTOLIC BLOOD PRESSURE: 80 MMHG | OXYGEN SATURATION: 99 %

## 2020-05-23 PROCEDURE — 12002 RPR S/N/AX/GEN/TRNK2.6-7.5CM: CPT

## 2020-05-23 PROCEDURE — 99282 EMERGENCY DEPT VISIT SF MDM: CPT | Mod: 25

## 2020-05-23 PROCEDURE — 99283 EMERGENCY DEPT VISIT LOW MDM: CPT | Mod: 25

## 2020-05-23 NOTE — ED PROVIDER NOTE - PHYSICAL EXAMINATION
Gen: NAD, AOx3  Head: NCAT  Lung: CTAB, no respiratory distress, no wheezing, rales, rhonchi  CV: Normal perfusion  Abd: soft, NTND  MSK: No edema, no visible deformities, full range of motion in all 4 extremities  Neuro: No focal neurologic deficits  Skin: 4cm laceration to left labia majora, extending to subcutaneous tissue, not actively bleeding   Psych: normal affect Gen: NAD, AOx3  Head: NCAT  Lung: CTAB, no respiratory distress, no wheezing, rales, rhonchi  CV: Normal perfusion  Abd: soft, NTND  MSK: No edema, no visible deformities, full range of motion in all 4 extremities  Neuro: No focal neurologic deficits  Skin: 3cm laceration to left labia majora, extending to subcutaneous tissue, not actively bleeding   Psych: normal affect
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
## 2020-05-23 NOTE — ED PROVIDER NOTE - PATIENT PORTAL LINK FT
You can access the FollowMyHealth Patient Portal offered by [facility] by registering at the following website: [email or web address]. By joining [employer]’s FollowMyHealth portal, you will also be able to view your health information using other applications (apps) compatible with our system.

## 2020-05-23 NOTE — ED PROVIDER NOTE - NSFOLLOWUPINSTRUCTIONS_ED_ALL_ED_FT
1. Follow up with your primary care physician within 2-3days for reevaluation.  2.  Return to the Emergency Department for worsening, progressive or any other concerning symptoms.   3.  Please do not have sex until you are seen by your ob/gyn and your laceration has completely healed.     Laceration    A laceration is a cut that goes through all of the layers of the skin and into the tissue that is right under the skin. Some lacerations heal on their own. Others need to be closed with skin adhesive strips, skin glue, stitches (sutures), or staples. Proper laceration care minimizes the risk of infection and helps the laceration to heal better.  If non-absorbable stitches or staples have been placed, they must be taken out within the time frame instructed by your healthcare provider.    SEEK IMMEDIATE MEDICAL CARE IF YOU HAVE ANY OF THE FOLLOWING SYMPTOMS: swelling around the wound, worsening pain, drainage from the wound, red streaking going away from your wound, inability to move finger or toe near the laceration, or discoloration of skin near the laceration.
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
## 2020-11-17 ENCOUNTER — EMERGENCY (EMERGENCY)
Facility: HOSPITAL | Age: 25
LOS: 1 days | Discharge: HOME | End: 2020-11-17
Attending: EMERGENCY MEDICINE
Payer: SELF-PAY

## 2020-11-17 VITALS
HEIGHT: 66 IN | DIASTOLIC BLOOD PRESSURE: 72 MMHG | RESPIRATION RATE: 20 BRPM | OXYGEN SATURATION: 98 % | TEMPERATURE: 99 F | HEART RATE: 95 BPM | WEIGHT: 173.06 LBS | SYSTOLIC BLOOD PRESSURE: 111 MMHG

## 2020-11-17 PROCEDURE — 99283 EMERGENCY DEPT VISIT LOW MDM: CPT

## 2020-11-17 PROCEDURE — 99284 EMERGENCY DEPT VISIT MOD MDM: CPT

## 2020-11-17 RX ORDER — IBUPROFEN 200 MG
1 TABLET ORAL
Qty: 20 | Refills: 0
Start: 2020-11-17 | End: 2020-11-21

## 2020-11-17 RX ORDER — IBUPROFEN 200 MG
800 TABLET ORAL ONCE
Refills: 0 | Status: COMPLETED | OUTPATIENT
Start: 2020-11-17 | End: 2020-11-17

## 2020-11-17 RX ADMIN — Medication 800 MILLIGRAM(S): at 14:14
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
## 2020-11-17 NOTE — ED PROVIDER NOTE - PATIENT PORTAL LINK FT
You can access the FollowMyHealth Patient Portal offered by [facility] by registering at the following website: [email or web address]. By joining [facility]’s FollowMyHealth portal, you will also be able to view your health information using other applications (apps) compatible with our system.
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
## 2021-02-11 ENCOUNTER — APPOINTMENT (OUTPATIENT)
Dept: ANTEPARTUM | Facility: CLINIC | Age: 26
End: 2021-02-11
Payer: MEDICAID

## 2021-02-11 ENCOUNTER — ASOB RESULT (OUTPATIENT)
Age: 26
End: 2021-02-11

## 2021-02-11 PROCEDURE — 99072 ADDL SUPL MATRL&STAF TM PHE: CPT

## 2021-02-11 PROCEDURE — 76817 TRANSVAGINAL US OBSTETRIC: CPT

## 2021-02-16 ENCOUNTER — APPOINTMENT (OUTPATIENT)
Dept: ANTEPARTUM | Facility: CLINIC | Age: 26
End: 2021-02-16

## 2021-03-11 ENCOUNTER — APPOINTMENT (OUTPATIENT)
Dept: ANTEPARTUM | Facility: CLINIC | Age: 26
End: 2021-03-11
Payer: MEDICAID

## 2021-03-11 ENCOUNTER — ASOB RESULT (OUTPATIENT)
Age: 26
End: 2021-03-11

## 2021-03-11 PROCEDURE — 99072 ADDL SUPL MATRL&STAF TM PHE: CPT

## 2021-03-11 PROCEDURE — 76813 OB US NUCHAL MEAS 1 GEST: CPT

## 2021-03-16 ENCOUNTER — APPOINTMENT (OUTPATIENT)
Dept: ANTEPARTUM | Facility: CLINIC | Age: 26
End: 2021-03-16

## 2021-03-18 ENCOUNTER — ASOB RESULT (OUTPATIENT)
Age: 26
End: 2021-03-18

## 2021-03-18 ENCOUNTER — APPOINTMENT (OUTPATIENT)
Dept: ANTEPARTUM | Facility: CLINIC | Age: 26
End: 2021-03-18

## 2021-05-06 ENCOUNTER — APPOINTMENT (OUTPATIENT)
Dept: ANTEPARTUM | Facility: CLINIC | Age: 26
End: 2021-05-06
Payer: MEDICAID

## 2021-05-06 ENCOUNTER — ASOB RESULT (OUTPATIENT)
Age: 26
End: 2021-05-06

## 2021-05-06 PROCEDURE — 76811 OB US DETAILED SNGL FETUS: CPT

## 2021-05-06 PROCEDURE — 99072 ADDL SUPL MATRL&STAF TM PHE: CPT

## 2021-07-09 ENCOUNTER — OUTPATIENT (OUTPATIENT)
Dept: OUTPATIENT SERVICES | Facility: HOSPITAL | Age: 26
LOS: 1 days | End: 2021-07-09
Payer: MEDICAID

## 2021-07-09 VITALS
TEMPERATURE: 98 F | RESPIRATION RATE: 16 BRPM | DIASTOLIC BLOOD PRESSURE: 62 MMHG | SYSTOLIC BLOOD PRESSURE: 119 MMHG | HEART RATE: 87 BPM

## 2021-07-09 VITALS
HEART RATE: 92 BPM | SYSTOLIC BLOOD PRESSURE: 113 MMHG | TEMPERATURE: 98 F | RESPIRATION RATE: 16 BRPM | DIASTOLIC BLOOD PRESSURE: 63 MMHG

## 2021-07-09 DIAGNOSIS — Z98.890 OTHER SPECIFIED POSTPROCEDURAL STATES: Chronic | ICD-10-CM

## 2021-07-09 LAB
ALBUMIN SERPL ELPH-MCNC: 3.2 G/DL — LOW (ref 3.3–5.2)
ALP SERPL-CCNC: 110 U/L — SIGNIFICANT CHANGE UP (ref 40–120)
ALT FLD-CCNC: 15 U/L — SIGNIFICANT CHANGE UP
ANION GAP SERPL CALC-SCNC: 11 MMOL/L — SIGNIFICANT CHANGE UP (ref 5–17)
AST SERPL-CCNC: 23 U/L — SIGNIFICANT CHANGE UP
BILIRUB SERPL-MCNC: 0.5 MG/DL — SIGNIFICANT CHANGE UP (ref 0.4–2)
BUN SERPL-MCNC: 3.3 MG/DL — LOW (ref 8–20)
CALCIUM SERPL-MCNC: 8.2 MG/DL — LOW (ref 8.6–10.2)
CHLORIDE SERPL-SCNC: 104 MMOL/L — SIGNIFICANT CHANGE UP (ref 98–107)
CO2 SERPL-SCNC: 23 MMOL/L — SIGNIFICANT CHANGE UP (ref 22–29)
CREAT SERPL-MCNC: 0.39 MG/DL — LOW (ref 0.5–1.3)
GLUCOSE SERPL-MCNC: 90 MG/DL — SIGNIFICANT CHANGE UP (ref 70–99)
HCT VFR BLD CALC: 27.7 % — LOW (ref 34.5–45)
HGB BLD-MCNC: 9.4 G/DL — LOW (ref 11.5–15.5)
MCHC RBC-ENTMCNC: 30.2 PG — SIGNIFICANT CHANGE UP (ref 27–34)
MCHC RBC-ENTMCNC: 33.9 GM/DL — SIGNIFICANT CHANGE UP (ref 32–36)
MCV RBC AUTO: 89.1 FL — SIGNIFICANT CHANGE UP (ref 80–100)
PLATELET # BLD AUTO: 185 K/UL — SIGNIFICANT CHANGE UP (ref 150–400)
POTASSIUM SERPL-MCNC: 3.1 MMOL/L — LOW (ref 3.5–5.3)
POTASSIUM SERPL-SCNC: 3.1 MMOL/L — LOW (ref 3.5–5.3)
PROT SERPL-MCNC: 6.1 G/DL — LOW (ref 6.6–8.7)
RBC # BLD: 3.11 M/UL — LOW (ref 3.8–5.2)
RBC # FLD: 13.4 % — SIGNIFICANT CHANGE UP (ref 10.3–14.5)
SODIUM SERPL-SCNC: 138 MMOL/L — SIGNIFICANT CHANGE UP (ref 135–145)
WBC # BLD: 5.65 K/UL — SIGNIFICANT CHANGE UP (ref 3.8–10.5)
WBC # FLD AUTO: 5.65 K/UL — SIGNIFICANT CHANGE UP (ref 3.8–10.5)

## 2021-07-09 PROCEDURE — G0463: CPT

## 2021-07-09 PROCEDURE — 85027 COMPLETE CBC AUTOMATED: CPT

## 2021-07-09 PROCEDURE — 80053 COMPREHEN METABOLIC PANEL: CPT

## 2021-07-09 PROCEDURE — 36415 COLL VENOUS BLD VENIPUNCTURE: CPT

## 2021-07-09 PROCEDURE — 59025 FETAL NON-STRESS TEST: CPT

## 2021-07-09 RX ORDER — SODIUM CHLORIDE 9 MG/ML
1000 INJECTION, SOLUTION INTRAVENOUS ONCE
Refills: 0 | Status: DISCONTINUED | OUTPATIENT
Start: 2021-07-09 | End: 2021-07-23

## 2021-07-09 RX ORDER — ONDANSETRON 8 MG/1
4 TABLET, FILM COATED ORAL ONCE
Refills: 0 | Status: DISCONTINUED | OUTPATIENT
Start: 2021-07-09 | End: 2021-07-23

## 2021-07-09 NOTE — OB PROVIDER TRIAGE NOTE - NSOBPROVIDERNOTE_OBGYN_ALL_OB_FT
27 yo  29.2 weeks c/o vomiting for 3 days  -IV hydration fo presumed dehydration, check labs re electrolyte abnormalities  -Zofran for vomiting  -reassuring fetal testing  -plan PO trial, discharge if stable 27 yo  29.2 weeks c/o vomiting for 3 days  -IV hydration fo presumed dehydration, check labs re electrolyte abnormalities  -Zofran for vomiting, refused  -reassuring fetal testing  -plan PO trial, discharge if stable  -crackers and juice tolerated, discharge to home with gastroenteritis, increase PO hydration and return for worsened symptoms

## 2021-07-09 NOTE — OB PROVIDER TRIAGE NOTE - HISTORY OF PRESENT ILLNESS
27 yo  29.2 weeks c/o vomiting for 3 days, last episode at 4 am. She reports good fetal movement, no loss of fluid, vaginal bleeding or contractions.  Patient reports routine pregnancy, has not heard result of diabetic screen.

## 2021-07-09 NOTE — OB PROVIDER TRIAGE NOTE - NSHPPHYSICALEXAM_GEN_ALL_CORE
ICU Vital Signs Last 24 Hrs  T(C): 36.6 (09 Jul 2021 08:22), Max: 36.6 (09 Jul 2021 08:22)  T(F): 97.9 (09 Jul 2021 08:22), Max: 97.9 (09 Jul 2021 08:22)  HR: 87 (09 Jul 2021 08:22) (87 - 87)  BP: 119/62 (09 Jul 2021 08:22) (119/62 - 119/62)  BP(mean): --  ABP: --  ABP(mean): --  RR: 16 (09 Jul 2021 08:22) (16 - 16)  SpO2: --    Abd: soft, NT ICU Vital Signs Last 24 Hrs  T(C): 36.6 (09 Jul 2021 08:22), Max: 36.6 (09 Jul 2021 08:22)  T(F): 97.9 (09 Jul 2021 08:22), Max: 97.9 (09 Jul 2021 08:22)  HR: 87 (09 Jul 2021 08:22) (87 - 87)  BP: 119/62 (09 Jul 2021 08:22) (119/62 - 119/62)  RR: 16 (09 Jul 2021 08:22) (16 - 16)      Abd: soft, NT

## 2021-07-09 NOTE — OB PROVIDER TRIAGE NOTE - NSHPLABSRESULTS_GEN_ALL_CORE
9.4    5.65  )-----------( 185      ( 09 Jul 2021 10:27 )             27.7       07-09    138  |  104  |  3.3<L>  ----------------------------<  90  3.1<L>   |  23.0  |  0.39<L>    Ca    8.2<L>      09 Jul 2021 10:27    TPro  6.1<L>  /  Alb  3.2<L>  /  TBili  0.5  /  DBili  x   /  AST  23  /  ALT  15  /  AlkPhos  110  07-09

## 2021-07-29 ENCOUNTER — APPOINTMENT (OUTPATIENT)
Dept: ANTEPARTUM | Facility: CLINIC | Age: 26
End: 2021-07-29
Payer: MEDICAID

## 2021-07-29 PROCEDURE — 76816 OB US FOLLOW-UP PER FETUS: CPT

## 2021-07-29 PROCEDURE — 76819 FETAL BIOPHYS PROFIL W/O NST: CPT

## 2021-08-03 ENCOUNTER — APPOINTMENT (OUTPATIENT)
Dept: ANTEPARTUM | Facility: CLINIC | Age: 26
End: 2021-08-03

## 2021-08-11 ENCOUNTER — OUTPATIENT (OUTPATIENT)
Dept: INPATIENT UNIT | Facility: HOSPITAL | Age: 26
LOS: 1 days | End: 2021-08-11
Payer: MEDICAID

## 2021-08-11 VITALS — SYSTOLIC BLOOD PRESSURE: 99 MMHG | DIASTOLIC BLOOD PRESSURE: 57 MMHG | HEART RATE: 86 BPM

## 2021-08-11 DIAGNOSIS — Z98.890 OTHER SPECIFIED POSTPROCEDURAL STATES: Chronic | ICD-10-CM

## 2021-08-11 DIAGNOSIS — O47.03 FALSE LABOR BEFORE 37 COMPLETED WEEKS OF GESTATION, THIRD TRIMESTER: ICD-10-CM

## 2021-08-11 LAB
BLD GP AB SCN SERPL QL: SIGNIFICANT CHANGE UP
HCT VFR BLD CALC: 28.2 % — LOW (ref 34.5–45)
HGB BLD-MCNC: 9.4 G/DL — LOW (ref 11.5–15.5)
MCHC RBC-ENTMCNC: 31 PG — SIGNIFICANT CHANGE UP (ref 27–34)
MCHC RBC-ENTMCNC: 33.3 GM/DL — SIGNIFICANT CHANGE UP (ref 32–36)
MCV RBC AUTO: 93.1 FL — SIGNIFICANT CHANGE UP (ref 80–100)
PLATELET # BLD AUTO: 222 K/UL — SIGNIFICANT CHANGE UP (ref 150–400)
RBC # BLD: 3.03 M/UL — LOW (ref 3.8–5.2)
RBC # FLD: 14.4 % — SIGNIFICANT CHANGE UP (ref 10.3–14.5)
WBC # BLD: 8.54 K/UL — SIGNIFICANT CHANGE UP (ref 3.8–10.5)
WBC # FLD AUTO: 8.54 K/UL — SIGNIFICANT CHANGE UP (ref 3.8–10.5)

## 2021-08-11 PROCEDURE — 85027 COMPLETE CBC AUTOMATED: CPT

## 2021-08-11 PROCEDURE — 99213 OFFICE O/P EST LOW 20 MIN: CPT

## 2021-08-11 PROCEDURE — 86850 RBC ANTIBODY SCREEN: CPT

## 2021-08-11 PROCEDURE — 36415 COLL VENOUS BLD VENIPUNCTURE: CPT

## 2021-08-11 PROCEDURE — 59025 FETAL NON-STRESS TEST: CPT

## 2021-08-11 PROCEDURE — 86901 BLOOD TYPING SEROLOGIC RH(D): CPT

## 2021-08-11 PROCEDURE — G0463: CPT

## 2021-08-11 PROCEDURE — 86900 BLOOD TYPING SEROLOGIC ABO: CPT

## 2021-08-11 RX ORDER — ACETAMINOPHEN 500 MG
2 TABLET ORAL
Qty: 32 | Refills: 0
Start: 2021-08-11 | End: 2021-08-14

## 2021-08-11 NOTE — OB PROVIDER TRIAGE NOTE - NSHPPHYSICALEXAM_GEN_ALL_CORE
T(C): 36.7 (08-11-21 @ 10:27), Max: 36.7 (08-11-21 @ 10:27)  HR: 87 (08-11-21 @ 10:27) (87 - 87)  BP: 119/68 (08-11-21 @ 10:27) (119/68 - 119/68)  RR: 17 (08-11-21 @ 10:27) (17 - 17)    General: well-appearing, NAD  Heart: RRR, no murmurs/rubs/gallops  Lungs: clear to auscultation bilaterally  Abdomen: soft, nontender; gravid uterus. No visible bruising, superficial lacerations, or other signs of trauma.   Extremities: warm, no edema   SVE: 0/0/-3  FHT: baseline 135, moderate variability, +accels, no decels  [place]: uterine irritability

## 2021-08-11 NOTE — OB RN TRIAGE NOTE - CHIEF COMPLAINT QUOTE
" Fell yesterday and having low abdominal pain and groin pain level 8.5 and hurt R knee also and left side of her body

## 2021-08-11 NOTE — OB PROVIDER TRIAGE NOTE - HISTORY OF PRESENT ILLNESS
DILAN ARAUZ is a 25yo  at 34w presenting with lower abdominal pain and pelvic pressure following a fall yesterday. She states that last night around 9pm she was walking on a tile floor while carrying her son when she slipped and fell onto her left side. Her L thigh, L flank, and R knee made the most contact with the floor. She denies head trauma. Following the fall, she has been experiencing bilateral lower abdominal/suprapubic crampy pain that is at an 8/10 severity. She also has a sensation of pelvic pressure. She did not take any medications for the pain, and she denies nausea, vomiting, vaginal bleeding, leakage of fluid, or regular painful contractions. She reports good fetal movement.     Current PNC uncomplicated.    OB Hx:  G1 2019  at 40w, uncomplicated. Male infant, 9lbs    Gyn Hx: denies   PMH: denies  PSH: R knee ligament repair  Meds: PNV  Allergies: ciprofloxacin (anaphylaxis)  SocHx: denies use of alcohol, tobacco, or recreational drugs during pregnancy. Feels safe at home.  DILAN ARAUZ is a 27yo  at 34w presenting with lower abdominal pain and pelvic pressure following a fall yesterday. She states that last night around 9pm she was walking on a tile floor while carrying her son when she slipped and fell onto her left side. Her L thigh, L flank, and R knee made the most contact with the floor. She denies head trauma. Following the fall, she has been experiencing bilateral lower abdominal/suprapubic crampy pain.  She also has a sensation of pelvic pressure. She did not take any medications for the pain, and she denies nausea, vomiting, vaginal bleeding, leakage of fluid, or regular painful contractions. She reports good fetal movement.     Current PNC with Dr. Bahena. Indicates uncomplicated.    OB Hx:  G1 2019  at 40w, uncomplicated. Male infant, 9lbs    Gyn Hx: denies   PMH: denies  PSH: R knee ligament repair  Meds: PNV  Allergies: ciprofloxacin (anaphylaxis)  SocHx: denies use of alcohol, tobacco, or recreational drugs during pregnancy. Feels safe at home.

## 2021-08-11 NOTE — OB PROVIDER TRIAGE NOTE - NSOBPROVIDERNOTE_OBGYN_ALL_OB_FT
DILAN ARAUZ is a 27yo  at 34w presenting with lower abdominal pain and pelvic pressure following a fall yesterday.     A/P:  -Vital signs stable, physical exam within normal limits  -BPP 10/10, reactive NST  -Dispo: discharge to home, follow up with outpatient OB provider    Plan discussed with Dr. Koch. DILAN ARAUZ is a 25yo  at 34w presenting with lower abdominal pain and pelvic pressure following a fall yesterday.     A/P:  -Vital signs stable, physical exam within normal limits  -BPP 10/10, reactive NST  -Dispo: discharge to home, follow up with outpatient OB provider    Plan discussed with Dr. Koch.    ATTENDING ADDENDUM:  Patient seen and evaluated. c/o of fall more than 12 hours ago. Denies abdominal or head trauma. Denies lOF/VB/ctx. +FM.  c/o some cramping but presented b/c she was sent home from work    VSS afeb  Fundus: firm, NT  SSE: closed and no pooling and no blood per PGY 2  Reactive NST, [place] irregular  Rh positive per ACOG    a/p: 25 y/o  s/p Fall 9PM on 8/10/21  -reassuring fetal status after 2 hrs of monitoring  -precautions given  -f/u with primary OB    Dr. MARLY Koch  Covering Laborist

## 2021-08-11 NOTE — OB PROVIDER TRIAGE NOTE - NSPREVIOUSLIVEBIRTHSNOWDEAD_OBGYN_ALL_OB_NU
- start Jardiance 10 mg daily  - once you start Jardiance, you can try stopping insulin  - drink more water  - you should establish care with primary doctor  - return to see Makenzie Caputo in 6 weeks with lab in 6 weeks  - return to clinic in 3 months    If you have any questions, please do not hesitate to call [facility] at [phone] and ask for Endocrinology clinic.    Sincerely,    Keith Palomares MD  Endocrinology      [facility]-Department of Endocrinology  Makenzie Caputo RN, Diabetes Educator: [phone]  Clinic Nurses AMMY Kirkland; CMA's: Yamel Jackson [phone]  MORENA Laughlin : [phone]  Clinic Fax: [phone]  On-Call Endocrine at the [place] (after hours/weekends): [phone] option 4  Scheduling Line: [phone]    Appointment Reminders:  * Please bring meter with for staff to download  * If you are due ONLY for an A1C, it is scheduled with the nurse and will be done in clinic. You do not need to schedule a lab appointment. Fasting is not required for an A1C.  * Refill request should be submitted to your pharmacy. They will contact clinic for approval.       0

## 2021-08-11 NOTE — OB RN TRIAGE NOTE - NS_TRIAGEADDITIONAL COMMENTS_OBGYN_ALL_OB_FT
REactive NST with occasional irritability noted. Pt given PO hydration. CBC, Type and screen, KB sent. Bedside sono and speculum evam done. All findings WNL. 1230 Pt evaluated by OB and discharged home with instructions. Pt denies any questions or complaints at this time.

## 2021-08-26 ENCOUNTER — ASOB RESULT (OUTPATIENT)
Age: 26
End: 2021-08-26

## 2021-08-26 ENCOUNTER — APPOINTMENT (OUTPATIENT)
Dept: ANTEPARTUM | Facility: CLINIC | Age: 26
End: 2021-08-26
Payer: MEDICAID

## 2021-08-26 PROCEDURE — 76816 OB US FOLLOW-UP PER FETUS: CPT

## 2021-09-20 RX ORDER — CITRIC ACID/SODIUM CITRATE 300-500 MG
30 SOLUTION, ORAL ORAL ONCE
Refills: 0 | Status: DISCONTINUED | OUTPATIENT
Start: 2021-09-22 | End: 2021-09-23

## 2021-09-20 RX ORDER — OXYTOCIN 10 UNIT/ML
333.33 VIAL (ML) INJECTION
Qty: 20 | Refills: 0 | Status: DISCONTINUED | OUTPATIENT
Start: 2021-09-22 | End: 2021-09-24

## 2021-09-20 RX ORDER — SODIUM CHLORIDE 9 MG/ML
1000 INJECTION, SOLUTION INTRAVENOUS
Refills: 0 | Status: DISCONTINUED | OUTPATIENT
Start: 2021-09-22 | End: 2021-09-23

## 2021-09-20 NOTE — OB PROVIDER H&P - ASSESSMENT
DILAN ARAUZ is a 26 year old  at 40w GA who presents to L&D for elective IOL.  A/P:  DILAN ARAUZ is a 26 year old  at 40w GA who presents to L&D for elective IOL.     -Admit to L&D  -Consent  -Admission labs  -NPO, except ice chips   -IV fluids  -Labor: Intact. Not in labor. Not warner.   -Fetus: Cat I tracing. Continuous toco and fetal monitoring.   -GBS: Negative, no GBS ppx required     Discussed with Dr. Bahena A/P:  DILAN ARAUZ is a 26 year old  at 40w GA who presents to L&D for elective IOL.     -Admit to L&D  -Consent  -Admission labs  -NPO, except ice chips   -IV fluids  -Labor: Intact. Not in labor. Not warner.   -Fetus: Cat I tracing. Continuous toco and fetal monitoring.   -GBS: Negative, no GBS ppx required   - Pitocin induction     Discussed with Dr. Bahena

## 2021-09-20 NOTE — OB PROVIDER H&P - HISTORY OF PRESENT ILLNESS
DILAN ARAUZ is a 26 year old  at 40w GA who presents to L&D for elective IOL.     ARIANNA: 21   LMP: 20     Pregnancy course: uncomplicated      Obhx:   G1:  @40w (2019; [id number])   G2: Current   GynHx: - ovarian cysts, fibroids, hx of STI and abnormal paps   Pmhx: denies  Pshx: R knee ligament repair ()   Meds: PVN, Omnicef (abx for L ear - injection)    Allergies: Ciprofloxacin (anaphylaxis), pecans/walnuts/peaches (hives), pollen   SH: denies EtOH, tobacco and illicit drug use in pregnancy    BMI: 32.6   Ultrasound: VTX, anterior placenta ()   EFW: 2209g ()   DILAN ARAUZ is a 26 year old  at 40w GA dated by first trimester sono who presents to L&D for elective IOL. Pt endorses good fetal movement. She denies contractions, vaginal bleeding, and leakage of fluid. Denies headache, chest pain, shortness of breath, nausea, and vomiting. Pt has no complaints at this time.    ARIANNA: 21   LMP: 20     Pregnancy course: uncomplicated      Obhx:   G1:  @40w (2019; [id number])   G2: Current   GynHx: - ovarian cysts, fibroids, hx of STI and abnormal paps   Pmhx: denies  Pshx: R knee ligament repair ()   Meds: PVN, Omnicef (abx for L ear - injection)    Allergies: Ciprofloxacin (anaphylaxis), pecans/walnuts/peaches (hives), pollen   SH: denies EtOH, tobacco and illicit drug use in pregnancy    BMI: 32.6   Ultrasound: VTX, anterior placenta ()   EFW: 2209g ()   DILAN ARAUZ is a 26 year old  at 40w GA dated by first trimester sono who presents to L&D for elective IOL. Pt endorses good fetal movement. She denies contractions, vaginal bleeding, and leakage of fluid. Denies headache, chest pain, shortness of breath, nausea, and vomiting. Pt has no complaints at this time.    ARIANNA: 21   LMP: 20     Pregnancy course: uncomplicated      Obhx: unremakable  G1:  @40w (2019; [id number])   G2: Current   GynHx: - ovarian cysts, fibroids, hx of STI and abnormal paps   Pmhx: denies  Pshx: R knee ligament repair ()   Meds: PVN, Omnicef (abx for L ear - injection)    Allergies: Ciprofloxacin (anaphylaxis), pecans/walnuts/peaches (hives), pollen   SH: denies EtOH, tobacco and illicit drug use in pregnancy    BMI: 32.6   Ultrasound: VTX, anterior placenta ()   EFW: 2209g ()

## 2021-09-20 NOTE — OB PROVIDER H&P - NSHPPHYSICALEXAM_GEN_ALL_CORE
HR: 99 (09-22-21 @ 14:06) (99 - 99)  BP: 117/74 (09-22-21 @ 14:06) (117/74 - 117/74)    Gen: NAD, well-appearing, AAOx3   Abd: Soft, gravid  Ext: non-tender, non-edematous  Bedside sono: VTX, Anterior Placenta  FHT: Baseline 125 bpm, moderate variability, + accels, - decels.  [place]: Acontractile. HR: 99 (09-22-21 @ 14:06) (99 - 99)  BP: 117/74 (09-22-21 @ 14:06) (117/74 - 117/74)    Gen: NAD, well-appearing, AAOx3   Abd: Soft, gravid  Ext: non-tender, non-edematous  Bedside sono: VTX, Anterior Placenta  FHT: Baseline 125 bpm, moderate variability, + accels, - decels.  [place]: Acontractile HR: 99 (09-22-21 @ 14:06) (99 - 99)  BP: 117/74 (09-22-21 @ 14:06) (117/74 - 117/74)    Gen: NAD, well-appearing, AAOx3   Abd: Soft, gravid  Ext: non-tender, non-edematous  Bedside sono: VTX, Anterior Placenta  SVE: 3/70/-2    FHT: Baseline 125 bpm, moderate variability, + accels, - decels.  [place]: Acontractile

## 2021-09-22 ENCOUNTER — INPATIENT (INPATIENT)
Facility: HOSPITAL | Age: 26
LOS: 1 days | Discharge: HOME | End: 2021-09-24
Attending: SPECIALIST | Admitting: SPECIALIST
Payer: MEDICAID

## 2021-09-22 VITALS — RESPIRATION RATE: 18 BRPM | TEMPERATURE: 98 F

## 2021-09-22 DIAGNOSIS — Z98.890 OTHER SPECIFIED POSTPROCEDURAL STATES: Chronic | ICD-10-CM

## 2021-09-22 LAB
BASOPHILS # BLD AUTO: 0.01 K/UL — SIGNIFICANT CHANGE UP (ref 0–0.2)
BASOPHILS NFR BLD AUTO: 0.1 % — SIGNIFICANT CHANGE UP (ref 0–2)
BLD GP AB SCN SERPL QL: SIGNIFICANT CHANGE UP
EOSINOPHIL # BLD AUTO: 0.06 K/UL — SIGNIFICANT CHANGE UP (ref 0–0.5)
EOSINOPHIL NFR BLD AUTO: 0.7 % — SIGNIFICANT CHANGE UP (ref 0–6)
HCT VFR BLD CALC: 29.6 % — LOW (ref 34.5–45)
HGB BLD-MCNC: 10 G/DL — LOW (ref 11.5–15.5)
IMM GRANULOCYTES NFR BLD AUTO: 0.6 % — SIGNIFICANT CHANGE UP (ref 0–1.5)
LYMPHOCYTES # BLD AUTO: 1.5 K/UL — SIGNIFICANT CHANGE UP (ref 1–3.3)
LYMPHOCYTES # BLD AUTO: 16.9 % — SIGNIFICANT CHANGE UP (ref 13–44)
MCHC RBC-ENTMCNC: 28.7 PG — SIGNIFICANT CHANGE UP (ref 27–34)
MCHC RBC-ENTMCNC: 33.8 GM/DL — SIGNIFICANT CHANGE UP (ref 32–36)
MCV RBC AUTO: 84.8 FL — SIGNIFICANT CHANGE UP (ref 80–100)
MONOCYTES # BLD AUTO: 0.71 K/UL — SIGNIFICANT CHANGE UP (ref 0–0.9)
MONOCYTES NFR BLD AUTO: 8 % — SIGNIFICANT CHANGE UP (ref 2–14)
NEUTROPHILS # BLD AUTO: 6.56 K/UL — SIGNIFICANT CHANGE UP (ref 1.8–7.4)
NEUTROPHILS NFR BLD AUTO: 73.7 % — SIGNIFICANT CHANGE UP (ref 43–77)
PLATELET # BLD AUTO: 244 K/UL — SIGNIFICANT CHANGE UP (ref 150–400)
RBC # BLD: 3.49 M/UL — LOW (ref 3.8–5.2)
RBC # FLD: 14.9 % — HIGH (ref 10.3–14.5)
SARS-COV-2 RNA SPEC QL NAA+PROBE: SIGNIFICANT CHANGE UP
WBC # BLD: 8.89 K/UL — SIGNIFICANT CHANGE UP (ref 3.8–10.5)
WBC # FLD AUTO: 8.89 K/UL — SIGNIFICANT CHANGE UP (ref 3.8–10.5)

## 2021-09-22 RX ORDER — SODIUM CHLORIDE 9 MG/ML
1000 INJECTION, SOLUTION INTRAVENOUS ONCE
Refills: 0 | Status: COMPLETED | OUTPATIENT
Start: 2021-09-22 | End: 2021-09-22

## 2021-09-22 RX ORDER — OXYTOCIN 10 UNIT/ML
VIAL (ML) INJECTION
Qty: 30 | Refills: 0 | Status: DISCONTINUED | OUTPATIENT
Start: 2021-09-22 | End: 2021-09-23

## 2021-09-22 RX ORDER — OXYTOCIN 10 UNIT/ML
2 VIAL (ML) INJECTION
Qty: 30 | Refills: 0 | Status: DISCONTINUED | OUTPATIENT
Start: 2021-09-22 | End: 2021-09-24

## 2021-09-22 RX ORDER — SODIUM CHLORIDE 9 MG/ML
1000 INJECTION, SOLUTION INTRAVENOUS
Refills: 0 | Status: DISCONTINUED | OUTPATIENT
Start: 2021-09-22 | End: 2021-09-24

## 2021-09-22 RX ADMIN — SODIUM CHLORIDE 1000 MILLILITER(S): 9 INJECTION, SOLUTION INTRAVENOUS at 22:01

## 2021-09-22 RX ADMIN — SODIUM CHLORIDE 125 MILLILITER(S): 9 INJECTION, SOLUTION INTRAVENOUS at 15:41

## 2021-09-22 RX ADMIN — SODIUM CHLORIDE 125 MILLILITER(S): 9 INJECTION, SOLUTION INTRAVENOUS at 23:24

## 2021-09-22 RX ADMIN — Medication 2 MILLIUNIT(S)/MIN: at 22:57

## 2021-09-22 RX ADMIN — Medication 1 ENEMA: at 14:00

## 2021-09-22 RX ADMIN — Medication 2 MILLIUNIT(S)/MIN: at 15:42

## 2021-09-22 NOTE — OB RN DELIVERY SUMMARY - NSSELHIDDEN_OBGYN_ALL_OB_FT
[NS_DeliveryAssist1_OBGYN_ALL_OB_FT:[id number]],[NS_DeliveryRN_OBGYN_ALL_OB_FT:[id number]] [NS_DeliveryAssist1_OBGYN_ALL_OB_FT:[id number]],[NS_DeliveryRN_OBGYN_ALL_OB_FT:[id number]],[NS_DeliveryAttending1_OBGYN_ALL_OB_FT:[id number]]

## 2021-09-22 NOTE — OB RN DELIVERY SUMMARY - NS_INTRAPARTUMABXTYPE_OBGYN_ALL_OB
Called pharmacy to clarify what needs to be approved and confirmed the Freestyle Device and Freestyle Jenni sensors need a PA.  Patient does not use Accu-check.    No antibiotics or any antibiotics < 2 hrs prior to birth

## 2021-09-22 NOTE — OB PROVIDER LABOR PROGRESS NOTE - ASSESSMENT
DILAN ARAUZ is a 26 year old  at 40w GA admin for eIOL    Plan:  - VSS  - Reactive tracing  - s/p epidural placement  - C/W pitocin augmentation (Currently @ 4mu)  - Continuous FHT/[place]

## 2021-09-22 NOTE — OB RN PATIENT PROFILE - NS_FINALEDD_OBGYN_ALL_OB_DT
Chief complaint:   Chief Complaint   Patient presents with   • Cough     cough past month: now worse with SOB        Vitals:  Visit Vitals   • /74   • Pulse 98   • Temp 99.3 °F (37.4 °C) (Oral)   • Resp 22   • Ht 5' 3\" (1.6 m)   • Wt 101.2 kg   • SpO2 96%   • BMI 39.5 kg/m2       HISTORY OF PRESENT ILLNESS     HPI Comments: 57-year-old female reports that she was diagnosed with adult asthma 10 years ago. Today she presents with chief complaint of chronic cough and tickle in her throat She was doing well until 1 month ago. May 17, 2017 she presented to her primary with cough, she was diagnosed with mild intermittent asthma and treated with prednisone, Breo, and Phenergan With Codeine cough medication. Patient continued to be symptomatic. She was seen on June 1, 2017, diagnosed with bronchitis treated with Z-Geovanny and guaifenesin with codeine. Patient again continued to be symptomatic with cough and intermittent shortness of breath. She was seen on June 6, 2017, chest x-ray was within normal limits. She is currently on a prednisone taper for 12 days 40 mg for 3 days, 30 mg for 3 days, 20 mg for 3 days and 10 mg for 3 days. Singulair, Breo, and Flonase was added to her medication regimen. She has also been using her albuterol 2 puffs every 4-6 hours. No chest pain. Cough is keeping her awake at night. No chest tightness. Patient has not seen a pulmonologist. No recent travel history. Denies any sick contacts. 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 No indigestion. No heartburn.    Past medical history, past surgical history, allergies, medications, and social history reviewed in the Epic chart as noted on June 14, 2017.    Cough   Pertinent negatives include no chest pain, chills, ear pain, fever, headaches, postnasal drip, rash, shortness of breath or wheezing.       Other significant problems:  Patient Active Problem List    Diagnosis Date Noted   • Dyslipidemia 03/01/2017     Priority: Low   • Primary  osteoarthritis involving multiple joints 09/13/2016     Priority: Low   • Hyperglycemia 09/13/2016     Priority: Low   • Overweight 09/13/2016     Priority: Low   • Essential hypertension 09/13/2016     Priority: Low   • Left knee pain 05/25/2016     Priority: Low   • Emotional stress reaction 03/29/2016     Priority: Low   • Memory difficulties 03/29/2016     Priority: Low   • Mild intermittent asthma without complication 01/08/2016     Priority: Low   • GERD (gastroesophageal reflux disease) 01/15/2013   • Depression with anxiety 01/15/2013   • Herpes 01/15/2013   • DM (diabetes mellitus) (CMS/[facility]) 01/15/2013   • Fibromyalgia 01/15/2013   • JONNY (obstructive sleep apnea) 01/15/2013       PAST MEDICAL, FAMILY AND SOCIAL HISTORY     Medications:  Current Outpatient Prescriptions   Medication   • Fluticasone Furoate-Vilanterol (BREO ELLIPTA IN)   • predniSONE (DELTASONE) 10 MG tablet   • montelukast (SINGULAIR) 10 MG tablet   • meloxicam (MOBIC) 15 MG tablet   • buPROPion (WELLBUTRIN XL) 150 MG 24 hr tablet   • albuterol (PROAIR HFA) 108 (90 Base) MCG/ACT inhaler   • promethazine-codeine (PHENERGAN WITH CODEINE) 6.25-10 MG/5ML syrup   • fluticasone (FLONASE) 50 MCG/ACT nasal spray   • valACYclovir (VALTREX) 500 MG tablet   • metformin (GLUCOPHAGE) 1000 MG tablet   • losartan-hydrochlorothiazide (HYZAAR) 50-12.5 MG per tablet   • pravastatin (PRAVACHOL) 10 MG tablet   • ibuprofen (MOTRIN) 800 MG tablet   • Cholecalciferol (VITAMIN D) 2000 UNITS CAPS   • cyanocobalamin (VITAMIN B-12) 500 MCG tablet   • fexofenadine (ALLEGRA) 180 MG tablet   • benzonatate (TESSALON PERLES) 200 MG capsule     No current facility-administered medications for this visit.        Allergies:  ALLERGIES:   Allergen Reactions   • Adhesive RASH   • Amoxicillin RASH   • Latex RASH   • Sulfa Drugs Cross Reactors        Past Medical  History/Surgeries:  Past Medical History:   Diagnosis Date   • Anxiety    • Asthma    • Depression    • Depression  with anxiety    • DM (diabetes mellitus) (CMS/[facility])    • Fibromyalgia    • GERD (gastroesophageal reflux disease)    • Herpes    • OA (osteoarthritis)    • Obesity    • JONNY (obstructive sleep apnea)        Past Surgical History:   Procedure Laterality Date   • APPENDECTOMY     • CHOLECYSTECTOMY     • COLONOSCOPY  01/18/2010   • ENDOMETRIAL ABLATION     • LAPAROSCOPY,TUBAL CAUTERY      Tubal Ligation   • TONSILLECTOMY AND ADENOIDECTOMY     • TUBAL LIGATION         Family History:  Family History   Problem Relation Age of Onset   • Diabetes Mother    • High blood pressure Mother    • Arthritis Mother    • Kidney disease Father    • Diabetes Father    • High blood pressure Father    • Heart disease Father    • Heart failure Father    • Diabetes Sister    • Diabetes Sister      lost weight - resolve   • Diabetes Brother        Social History:  Social History   Substance Use Topics   • Smoking status: Never Smoker   • Smokeless tobacco: Never Used   • Alcohol use No       REVIEW OF SYSTEMS     Review of Systems   Constitutional: Negative for activity change, appetite change, chills and fever.   HENT: Negative for congestion, ear pain and postnasal drip.    Eyes: Negative for discharge.   Respiratory: Positive for cough. Negative for apnea, chest tightness, shortness of breath and wheezing.    Cardiovascular: Negative for chest pain, palpitations and leg swelling.   Gastrointestinal: Negative for abdominal pain.   Genitourinary: Negative for difficulty urinating.   Musculoskeletal: Negative for back pain, gait problem and neck pain.   Skin: Negative for rash.   Neurological: Negative for dizziness, facial asymmetry, light-headedness and headaches.       PHYSICAL EXAM     Physical Exam   Constitutional: She is oriented to person, place, and time. She appears well-developed and well-nourished. No distress.   HENT:   Head: Normocephalic and atraumatic.   Right Ear: External ear normal.   Left Ear: External ear normal.   Nose:  Nose normal.   Mouth/Throat: Oropharynx is clear and moist. No oropharyngeal exudate.   Eyes: Conjunctivae and EOM are normal. Pupils are equal, round, and reactive to light. Right eye exhibits no discharge. Left eye exhibits no discharge. No scleral icterus.   Neck: Normal range of motion. Neck supple. No tracheal deviation present. No thyromegaly present.   Cardiovascular: Normal rate, regular rhythm and normal heart sounds.    Pulmonary/Chest: Effort normal and breath sounds normal. No respiratory distress. She has no wheezes. She has no rales. She exhibits no tenderness.   Patient has a cough every few seconds. Pulse ox on room 96%. Rare end expiratory wheezing. No rales. No rhonchi. No usage  of accessory muscles for breathing. No acute respiratory distress.    After verbal informed consent, patient was given a DuoNeb treatment times one. No further wheezing. Pulse ox improved to 98%.   Abdominal: Soft. Bowel sounds are normal.   Musculoskeletal: Normal range of motion. She exhibits no edema or tenderness.   Lymphadenopathy:     She has no cervical adenopathy.   Neurological: She is alert and oriented to person, place, and time. No cranial nerve deficit. She exhibits normal muscle tone. Coordination normal.   Skin: Skin is warm. No rash noted. She is not diaphoretic.   Psychiatric: She has a normal mood and affect. Her behavior is normal. Judgment and thought content normal.   Nursing note and vitals reviewed.      ASSESSMENT/PLAN     Assessment: Asthma cough variant with chronic persistent cough and bronchospasm.    Plan: Patient was referred to pulmonologist, appointment was made for Monday, June 19, 2017. If symptoms worsen she is to go to the emergency room. Patient will continue her current medications regimen, complete course of the prednisone taper. She will also start Allegra 180 mg by mouth daily. Tessalon Perles 200 mg capsule, 1 capsule 3 times a day as needed. Side effects of both medications  reviewed in detail with the patient. Home care instructions on cough due to bronchospasm was reviewed.        22-Sep-2021

## 2021-09-22 NOTE — OB RN DELIVERY SUMMARY - NS_SEPSISRSKCALC_OBGYN_ALL_OB_FT
EOS calculated successfully. EOS Risk Factor: 0.5/1000 live births ([facility] national incidence); GA=40w1d; Temp=98.6; ROM=1.867; GBS='Negative'; Antibiotics='No antibiotics or any antibiotics < 2 hrs prior to birth'

## 2021-09-23 ENCOUNTER — TRANSCRIPTION ENCOUNTER (OUTPATIENT)
Age: 26
End: 2021-09-23

## 2021-09-23 LAB
COVID-19 SPIKE DOMAIN AB INTERP: POSITIVE
COVID-19 SPIKE DOMAIN ANTIBODY RESULT: >250 U/ML — HIGH
HCT VFR BLD CALC: 31.6 % — LOW (ref 34.5–45)
HGB BLD-MCNC: 10.5 G/DL — LOW (ref 11.5–15.5)
SARS-COV-2 IGG+IGM SERPL QL IA: >250 U/ML — HIGH
SARS-COV-2 IGG+IGM SERPL QL IA: POSITIVE
T PALLIDUM AB TITR SER: NEGATIVE — SIGNIFICANT CHANGE UP

## 2021-09-23 RX ORDER — KETOROLAC TROMETHAMINE 30 MG/ML
30 SYRINGE (ML) INJECTION ONCE
Refills: 0 | Status: DISCONTINUED | OUTPATIENT
Start: 2021-09-23 | End: 2021-09-24

## 2021-09-23 RX ORDER — PRAMOXINE HYDROCHLORIDE 150 MG/15G
1 AEROSOL, FOAM RECTAL EVERY 4 HOURS
Refills: 0 | Status: DISCONTINUED | OUTPATIENT
Start: 2021-09-23 | End: 2021-09-24

## 2021-09-23 RX ORDER — AER TRAVELER 0.5 G/1
1 SOLUTION RECTAL; TOPICAL EVERY 4 HOURS
Refills: 0 | Status: DISCONTINUED | OUTPATIENT
Start: 2021-09-23 | End: 2021-09-24

## 2021-09-23 RX ORDER — HYDROCORTISONE 1 %
1 OINTMENT (GRAM) TOPICAL EVERY 6 HOURS
Refills: 0 | Status: DISCONTINUED | OUTPATIENT
Start: 2021-09-23 | End: 2021-09-24

## 2021-09-23 RX ORDER — MAGNESIUM HYDROXIDE 400 MG/1
30 TABLET, CHEWABLE ORAL
Refills: 0 | Status: DISCONTINUED | OUTPATIENT
Start: 2021-09-23 | End: 2021-09-24

## 2021-09-23 RX ORDER — BENZOCAINE 10 %
1 GEL (GRAM) MUCOUS MEMBRANE EVERY 6 HOURS
Refills: 0 | Status: DISCONTINUED | OUTPATIENT
Start: 2021-09-23 | End: 2021-09-24

## 2021-09-23 RX ORDER — SODIUM CHLORIDE 9 MG/ML
3 INJECTION INTRAMUSCULAR; INTRAVENOUS; SUBCUTANEOUS EVERY 8 HOURS
Refills: 0 | Status: DISCONTINUED | OUTPATIENT
Start: 2021-09-23 | End: 2021-09-24

## 2021-09-23 RX ORDER — DIPHENHYDRAMINE HCL 50 MG
25 CAPSULE ORAL EVERY 6 HOURS
Refills: 0 | Status: COMPLETED | OUTPATIENT
Start: 2021-09-23 | End: 2022-08-22

## 2021-09-23 RX ORDER — IBUPROFEN 200 MG
600 TABLET ORAL EVERY 6 HOURS
Refills: 0 | Status: COMPLETED | OUTPATIENT
Start: 2021-09-23 | End: 2022-08-22

## 2021-09-23 RX ORDER — OXYCODONE HYDROCHLORIDE 5 MG/1
5 TABLET ORAL
Refills: 0 | Status: DISCONTINUED | OUTPATIENT
Start: 2021-09-23 | End: 2021-09-24

## 2021-09-23 RX ORDER — ACETAMINOPHEN 500 MG
975 TABLET ORAL
Refills: 0 | Status: DISCONTINUED | OUTPATIENT
Start: 2021-09-23 | End: 2021-09-24

## 2021-09-23 RX ORDER — DIBUCAINE 1 %
1 OINTMENT (GRAM) RECTAL EVERY 6 HOURS
Refills: 0 | Status: DISCONTINUED | OUTPATIENT
Start: 2021-09-23 | End: 2021-09-24

## 2021-09-23 RX ORDER — DIPHENHYDRAMINE HCL 50 MG
25 CAPSULE ORAL EVERY 6 HOURS
Refills: 0 | Status: DISCONTINUED | OUTPATIENT
Start: 2021-09-23 | End: 2021-09-24

## 2021-09-23 RX ORDER — OXYCODONE HYDROCHLORIDE 5 MG/1
5 TABLET ORAL ONCE
Refills: 0 | Status: DISCONTINUED | OUTPATIENT
Start: 2021-09-23 | End: 2021-09-24

## 2021-09-23 RX ORDER — OXYTOCIN 10 UNIT/ML
333.33 VIAL (ML) INJECTION
Qty: 20 | Refills: 0 | Status: DISCONTINUED | OUTPATIENT
Start: 2021-09-23 | End: 2021-09-24

## 2021-09-23 RX ORDER — LANOLIN
1 OINTMENT (GRAM) TOPICAL EVERY 6 HOURS
Refills: 0 | Status: DISCONTINUED | OUTPATIENT
Start: 2021-09-23 | End: 2021-09-24

## 2021-09-23 RX ORDER — SIMETHICONE 80 MG/1
80 TABLET, CHEWABLE ORAL EVERY 4 HOURS
Refills: 0 | Status: DISCONTINUED | OUTPATIENT
Start: 2021-09-23 | End: 2021-09-24

## 2021-09-23 RX ORDER — IBUPROFEN 200 MG
600 TABLET ORAL EVERY 6 HOURS
Refills: 0 | Status: DISCONTINUED | OUTPATIENT
Start: 2021-09-23 | End: 2021-09-24

## 2021-09-23 RX ADMIN — Medication 975 MILLIGRAM(S): at 04:46

## 2021-09-23 RX ADMIN — Medication 600 MILLIGRAM(S): at 06:12

## 2021-09-23 RX ADMIN — Medication 0.2 MILLIGRAM(S): at 07:30

## 2021-09-23 RX ADMIN — Medication 600 MILLIGRAM(S): at 23:35

## 2021-09-23 RX ADMIN — Medication 1 TABLET(S): at 12:03

## 2021-09-23 RX ADMIN — Medication 0.2 MILLIGRAM(S): at 01:36

## 2021-09-23 RX ADMIN — Medication 975 MILLIGRAM(S): at 20:49

## 2021-09-23 RX ADMIN — Medication 25 MILLIGRAM(S): at 04:14

## 2021-09-23 RX ADMIN — Medication 975 MILLIGRAM(S): at 15:30

## 2021-09-23 RX ADMIN — SODIUM CHLORIDE 3 MILLILITER(S): 9 INJECTION INTRAMUSCULAR; INTRAVENOUS; SUBCUTANEOUS at 06:12

## 2021-09-23 RX ADMIN — Medication 975 MILLIGRAM(S): at 04:01

## 2021-09-23 RX ADMIN — Medication 0.2 MILLIGRAM(S): at 13:12

## 2021-09-23 RX ADMIN — Medication 600 MILLIGRAM(S): at 13:00

## 2021-09-23 RX ADMIN — Medication 0.2 MILLIGRAM(S): at 19:07

## 2021-09-23 RX ADMIN — Medication 600 MILLIGRAM(S): at 12:03

## 2021-09-23 RX ADMIN — Medication 600 MILLIGRAM(S): at 18:45

## 2021-09-23 RX ADMIN — Medication 1000 MILLIUNIT(S)/MIN: at 02:47

## 2021-09-23 RX ADMIN — Medication 975 MILLIGRAM(S): at 21:34

## 2021-09-23 RX ADMIN — Medication 975 MILLIGRAM(S): at 14:46

## 2021-09-23 RX ADMIN — Medication 600 MILLIGRAM(S): at 17:57

## 2021-09-23 RX ADMIN — Medication 600 MILLIGRAM(S): at 05:27

## 2021-09-23 NOTE — OB PROVIDER DELIVERY SUMMARY - NSPROVIDERDELIVERYNOTE_OBGYN_ALL_OB_FT
Patient here for elective induction at term. Pitocin given for induction. Epidural given for anesthesia. . Live baby boy delivered OP presentation at 1:18 AM. Nuchal x1, reducible. Apgars 9/9. Weight 7 lb 11 oz.   Placenta delivered spontaneously, complete. Moderate bleeding noted and given Cytotec NV and Methergine series started. Bleeding stable. Small perineal tear noted and repaired with 2-0 chromic.   Instrument/sponge count correct x 2 and confirmed by nurse.

## 2021-09-24 VITALS
HEART RATE: 71 BPM | RESPIRATION RATE: 16 BRPM | DIASTOLIC BLOOD PRESSURE: 67 MMHG | SYSTOLIC BLOOD PRESSURE: 126 MMHG | TEMPERATURE: 98 F | OXYGEN SATURATION: 97 %

## 2021-09-24 PROCEDURE — 36415 COLL VENOUS BLD VENIPUNCTURE: CPT

## 2021-09-24 PROCEDURE — G0463: CPT

## 2021-09-24 PROCEDURE — 59050 FETAL MONITOR W/REPORT: CPT

## 2021-09-24 PROCEDURE — U0003: CPT

## 2021-09-24 PROCEDURE — 85025 COMPLETE CBC W/AUTO DIFF WBC: CPT

## 2021-09-24 PROCEDURE — 86850 RBC ANTIBODY SCREEN: CPT

## 2021-09-24 PROCEDURE — U0005: CPT

## 2021-09-24 PROCEDURE — 86780 TREPONEMA PALLIDUM: CPT

## 2021-09-24 PROCEDURE — 59025 FETAL NON-STRESS TEST: CPT

## 2021-09-24 PROCEDURE — 86901 BLOOD TYPING SEROLOGIC RH(D): CPT

## 2021-09-24 PROCEDURE — 86900 BLOOD TYPING SEROLOGIC ABO: CPT

## 2021-09-24 PROCEDURE — 86769 SARS-COV-2 COVID-19 ANTIBODY: CPT

## 2021-09-24 PROCEDURE — 85014 HEMATOCRIT: CPT

## 2021-09-24 PROCEDURE — 85018 HEMOGLOBIN: CPT

## 2021-09-24 RX ORDER — POLYETHYLENE GLYCOL 3350 17 G/17G
17 POWDER, FOR SOLUTION ORAL
Qty: 119 | Refills: 0
Start: 2021-09-24 | End: 2021-09-30

## 2021-09-24 RX ORDER — IBUPROFEN 200 MG
1 TABLET ORAL
Qty: 30 | Refills: 0
Start: 2021-09-24

## 2021-09-24 RX ORDER — ACETAMINOPHEN 500 MG
1 TABLET ORAL
Qty: 30 | Refills: 0
Start: 2021-09-24 | End: 2021-10-03

## 2021-09-24 RX ADMIN — Medication 0.2 MILLIGRAM(S): at 02:31

## 2021-09-24 RX ADMIN — Medication 975 MILLIGRAM(S): at 02:31

## 2021-09-24 RX ADMIN — Medication 975 MILLIGRAM(S): at 08:46

## 2021-09-24 RX ADMIN — Medication 600 MILLIGRAM(S): at 00:20

## 2021-09-24 RX ADMIN — Medication 975 MILLIGRAM(S): at 09:49

## 2021-09-24 RX ADMIN — Medication 975 MILLIGRAM(S): at 03:16

## 2021-09-24 RX ADMIN — Medication 600 MILLIGRAM(S): at 05:36

## 2021-09-24 RX ADMIN — Medication 600 MILLIGRAM(S): at 06:21

## 2021-09-24 NOTE — DISCHARGE NOTE OB - CARE PLAN
1 Principal Discharge DX:	Vaginal delivery  Assessment and plan of treatment:	1) Please take Ibuprofen as needed for pain control  2) Nothing in the vagina for 6 weeks (including no sex, no tampons, and no douching).  3) Please call your doctor for a follow up your postpartum appointment in 4-6 weeks.  4) Please continue taking vitamins postpartum. Take iron and colace for acute blood loss anemia.  5) Please call the office sooner if you have heavy vaginal bleeding, severe abdominal pain, or fever > 100.4F.  6) You may resume regular activity as tolerated

## 2021-09-24 NOTE — PROGRESS NOTE ADULT - ASSESSMENT
DILAN ARAUZ is a 26y  now PPD#1 s/p spontaneous vaginal delivery at 40w1d gestation, eIOL, uncomplicated. Patient received ppx Cytotec [place] and Methergine series.     A/P:    -Vital signs stable  -Hgb: 10-> 10.5  -Voiding, tolerating PO, bowel function nml   -Advance care as tolerated   -Continue routine postpartum care and education  -Healthy male infant, desires circumcision  - DVT ppx: Ambulation encouraged. SCDs while in bed.   -Dispo: Anticipate discharge to home today pending attending approval.

## 2021-09-24 NOTE — DISCHARGE NOTE OB - PLAN OF CARE
1) Please take Ibuprofen as needed for pain control  2) Nothing in the vagina for 6 weeks (including no sex, no tampons, and no douching).  3) Please call your doctor for a follow up your postpartum appointment in 4-6 weeks.  4) Please continue taking vitamins postpartum. Take iron and colace for acute blood loss anemia.  5) Please call the office sooner if you have heavy vaginal bleeding, severe abdominal pain, or fever > 100.4F.  6) You may resume regular activity as tolerated

## 2021-09-24 NOTE — PROGRESS NOTE ADULT - SUBJECTIVE AND OBJECTIVE BOX
DILAN ARAUZ is a 26y  now PPD#1 s/p spontaneous vaginal delivery at 40w1d gestation, [facility], uncomplicated. Patient received ppx Cytotec [place] and Methergine series.     S:    No acute events overnight.   The patient has no complaints.  Pain controlled with current treatment regimen.   She is ambulating without difficulty and tolerating PO.   + flatus/+BM/+ voiding   She endorses appropriate lochia, which is decreasing.   She is breastfeeding without difficulty. She denies feeling engorged.   She denies fevers, chills, nausea and vomiting.   She denies lightheadedness, dizziness, palpitations, chest pain and SOB.     O:    T(C): 36.6 (21 @ 05:10), Max: 37.1 (21 @ 15:34)  HR: 71 (21 @ 05:10) (59 - 71)  BP: 126/67 (21 @ 05:10) (122/61 - 126/67)  RR: 16 (21 @ 05:10) (16 - 18)  SpO2: 97% (21 @ 05:10) (97% - 98%)    Gen: NAD, AOx3  CV: RRR, S1/S2 present  Pulm: CTAB  Abdomen:  Soft, non-tender, non-distended, +bowel sounds  Uterus:  Fundus firm below umbilicus  VE:  Expected lochia  Ext:  Bilateral lower extremities non-tender and non-edematous                          10.5   x     )-----------( x        ( 23 Sep 2021 12:14 )             31.6             
S: Patient doing well. Minimal lochia. No complaints.    O: Vital Signs Last 24 Hrs  T(C): 36.6 (24 Sep 2021 05:10), Max: 37.1 (23 Sep 2021 15:34)  T(F): 97.8 (24 Sep 2021 05:10), Max: 98.8 (23 Sep 2021 15:34)  HR: 71 (24 Sep 2021 05:10) (59 - 71)  BP: 126/67 (24 Sep 2021 05:10) (122/61 - 126/67)  BP(mean): --  RR: 16 (24 Sep 2021 05:10) (16 - 18)  SpO2: 97% (24 Sep 2021 05:10) (97% - 98%)    Gen: NAD  Abd: soft, NT, ND, fundus firm below umbilicus  voiding well  Ext: no tenderness    Labs:                        10.5   x     )-----------( x        ( 23 Sep 2021 12:14 )             31.6          A/P PP # 1  Doing well.  Routine post partum care.  Discharge home in AM.      
S: Patient doing well. Minimal lochia. No complaints.    O: Vital Signs Last 24 Hrs  T(C): 37.4 (23 Sep 2021 03:40), Max: 37.4 (23 Sep 2021 03:40)  T(F): 99.3 (23 Sep 2021 03:40), Max: 99.3 (23 Sep 2021 03:40)  HR: 63 (23 Sep 2021 03:40) (54 - 110)  BP: 124/78 (23 Sep 2021 03:40) (91/51 - 138/80)  BP(mean): --  RR: 16 (23 Sep 2021 03:40) (15 - 18)  SpO2: 96% (23 Sep 2021 03:40) (94% - 98%)    Gen: NAD  Abd: soft, NT, ND, fundus firm below umbilicus  Ext: no tenderness    Labs:                        10.0   8.89  )-----------( 244      ( 22 Sep 2021 15:39 )             29.6     Antibody Screen: NEG (09-22 @ 15:25)       A/P PP # 0  Doing well.  Routine post partum care.  Discharge home in AM.

## 2021-09-24 NOTE — DISCHARGE NOTE OB - HOSPITAL COURSE
Patient underwent a normal spontaneous vaginal delivery @40w1d 2/2 elective induction of labor. Patient was given prophylactic CT Cytotec and Methergine series. Pain is well controlled with PRN medication. She has no difficulty with ambulation, voiding, or PO intake. Lab values and vital signs are within normal limits prior to discharge.

## 2021-09-24 NOTE — DISCHARGE NOTE OB - AVOID SEXUAL ACTIVITY UNTIL YOUR POSTPARTUM VISIT
Some of LE edema is secondary to chronic venous stasis, given skin changes.  This has improved.  - Management as above, may need compression stockings  - LE dopplers negative  - Leg elevation encouraged Statement Selected

## 2021-09-24 NOTE — CHART NOTE - NSCHARTNOTEFT_GEN_A_CORE
called to see patient due to passage of blood clot while in the shower  clot is dark red, size of lemon   VS reviewed  fundus firm  lochia normal  patient stable for discharge

## 2021-09-24 NOTE — DISCHARGE NOTE OB - MEDICATION SUMMARY - MEDICATIONS TO TAKE
I will START or STAY ON the medications listed below when I get home from the hospital:    acetaminophen 650 mg oral tablet, extended release  -- 1 tab(s) by mouth every 8 hours MDD:4  -- This product contains acetaminophen.  Do not use  with any other product containing acetaminophen to prevent possible liver damage.    -- Indication: For PAIN    ibuprofen 600 mg oral tablet  -- 1 tab(s) by mouth every 6 hours MDD:4  -- Do not take this drug if you are pregnant.  It is very important that you take or use this exactly as directed.  Do not skip doses or discontinue unless directed by your doctor.  May cause drowsiness or dizziness.  Obtain medical advice before taking any non-prescription drugs as some may affect the action of this medication.  Take with food or milk.    -- Indication: For PAIN    PreNata oral tablet, chewable  -- 1 tab(s) by mouth once a day  -- Indication: For HOME MED    MiraLax oral powder for reconstitution  -- 17 gram(s) by mouth once a day   -- Dilute this medication with liquid before administration.  It is very important that you take or use this exactly as directed.  Do not skip doses or discontinue unless directed by your doctor.    -- Indication: For CONSTIPATION

## 2021-09-24 NOTE — DISCHARGE NOTE OB - CARE PROVIDER_API CALL
Sharda Bahena)  [facility] Obstetrics and Gynecology  [facility] [place] [address]  Phone: [phone]  Fax: [phone]  Follow Up Time:

## 2021-09-24 NOTE — DISCHARGE NOTE OB - PATIENT PORTAL LINK FT
You can access the FollowMyHealth Patient Portal offered by [facility] by registering at the following website: [email or web address]. By joining [email or web address] FollowMyHealth portal, you will also be able to view your health information using other applications (apps) compatible with our system.

## 2021-11-02 ENCOUNTER — APPOINTMENT (OUTPATIENT)
Dept: FAMILY MEDICINE | Facility: CLINIC | Age: 26
End: 2021-11-02
Payer: MEDICAID

## 2021-11-02 ENCOUNTER — NON-APPOINTMENT (OUTPATIENT)
Age: 26
End: 2021-11-02

## 2021-11-02 VITALS
HEART RATE: 67 BPM | SYSTOLIC BLOOD PRESSURE: 130 MMHG | WEIGHT: 186 LBS | DIASTOLIC BLOOD PRESSURE: 70 MMHG | HEIGHT: 66 IN | BODY MASS INDEX: 29.89 KG/M2

## 2021-11-02 DIAGNOSIS — Z78.9 OTHER SPECIFIED HEALTH STATUS: ICD-10-CM

## 2021-11-02 PROCEDURE — 99213 OFFICE O/P EST LOW 20 MIN: CPT | Mod: 25

## 2021-11-02 PROCEDURE — 99385 PREV VISIT NEW AGE 18-39: CPT | Mod: 25

## 2021-11-02 NOTE — HISTORY OF PRESENT ILLNESS
[FreeTextEntry1] : establish care [de-identified] : MS. ARAUZ IS A PLEASANT 27 YO PRESENTING TO [facility] CARE.  HAD A BABY BOY ONE MONTH AGO VAGINAL DELIVERY.  NO COMPLICATIONS.  NOT DOWN OR DEPRESSED.  DENIES SUICIDAL/HOMICIDAL IDEATIONS OR PLANS.  TO SEE OB/GYN NEXT WEEK.  IS OVERALL FEELING VERY WELL TODAY.\par \par IS ALSO HERE FOR ACUTE VISIT.  FELL ACCIDENTLY AT HOME LAST NIGHT.  TRIPPED OVER HER SON'S TOYS.  FELL ON LEFT HAND.  NOW HURTS.  NOT SWOLLEN.  RIGHT THUMB AND POINTER HURT WITH GRABBING.  TINGLING TO WRIST AREA.  WRIST ALSO HURTS.  NO PRIOR INJURY.  TOOK TYLENOL.  IS OTHERWISE FEELING WELL.

## 2021-11-02 NOTE — PLAN
[FreeTextEntry1] : VACCINATIONS DISCUSSED: COVID, FLU\par VISION SCREENING\par HEALTHY DIET AND LIFESTYLE MODIFICATIONS\par CHECK ROUTINE LAB WORK\par TO FOLLOW-UP WITH OB/GYN\par SAFE HEALTHY HABITS REVIEWED\par \par TYLENOL PRN\par WILL GO FOR XRAY IMAGING IF PAIN PERSISTS\par FURTHER RECOMMENDATIONS PENDING RESULTS\par CALL WITH ANY QUESTIONS, CONCERNS OR CHANGES \par

## 2021-11-02 NOTE — HEALTH RISK ASSESSMENT
[Good] : ~his/her~ current health as good [Excellent] : ~his/her~  mood as  excellent [Yes] : Yes [Monthly or less (1 pt)] : Monthly or less (1 point) [1 or 2 (0 pts)] : 1 or 2 (0 points) [Never (0 pts)] : Never (0 points) [No] : In the past 12 months have you used drugs other than those required for medical reasons? No [Any fall with injury in past year] : Patient reported fall with injury in the past year [0] : 2) Feeling down, depressed, or hopeless: Not at all (0) [PHQ-2 Negative - No further assessment needed] : PHQ-2 Negative - No further assessment needed [Patient reported PAP Smear was normal] : Patient reported PAP Smear was normal [HIV test declined] : HIV test declined [Hepatitis C test declined] : Hepatitis C test declined [None] : None [With Family] : lives with family [# of Members in Household ___] :  household currently consist of [unfilled] member(s) [Employed] : employed [High School] : high school [Single] : single [# Of Children ___] : has [unfilled] children [Feels Safe at Home] : Feels safe at home [Fully functional (bathing, dressing, toileting, transferring, walking, feeding)] : Fully functional (bathing, dressing, toileting, transferring, walking, feeding) [Fully functional (using the telephone, shopping, preparing meals, housekeeping, doing laundry, using] : Fully functional and needs no help or supervision to perform IADLs (using the telephone, shopping, preparing meals, housekeeping, doing laundry, using transportation, managing medications and managing finances) [Reports normal functional visual acuity (ie: able to read med bottle)] : Reports normal functional visual acuity [Smoke Detector] : smoke detector [Carbon Monoxide Detector] : carbon monoxide detector [Safety elements used in home] : safety elements used in home [Seat Belt] :  uses seat belt [Sunscreen] : uses sunscreen [] : No [Audit-CScore] : 2 [de-identified] : NOT ROUTINELY EXERCISING [de-identified] : "OKAY", REMAINING HYDRATED.  AVOIDING EATING OUT. [de-identified] : SEE HPI [[id number]] : 0 [Change in mental status noted] : No change in mental status noted [Language] : denies difficulty with language [Learning/Retaining New Information] : denies difficulty learning/retaining new information [Handling Complex Tasks] : denies difficulty handling complex tasks [Reports changes in hearing] : Reports no changes in hearing [Reports changes in vision] : Reports no changes in vision [Reports changes in dental health] : Reports no changes in dental health [PapSmearDate] : 09/21 [PapSmearComments] : DR. ANGEL [de-identified] : GLASSES FOR DISTANCE AND READING

## 2021-12-03 ENCOUNTER — APPOINTMENT (OUTPATIENT)
Dept: DERMATOLOGY | Facility: CLINIC | Age: 26
End: 2021-12-03
Payer: MEDICAID

## 2021-12-03 PROCEDURE — 99203 OFFICE O/P NEW LOW 30 MIN: CPT

## 2022-01-05 ENCOUNTER — APPOINTMENT (OUTPATIENT)
Dept: FAMILY MEDICINE | Facility: CLINIC | Age: 27
End: 2022-01-05
Payer: MEDICAID

## 2022-01-05 VITALS
HEIGHT: 66 IN | BODY MASS INDEX: 30.37 KG/M2 | DIASTOLIC BLOOD PRESSURE: 70 MMHG | OXYGEN SATURATION: 100 % | WEIGHT: 189 LBS | HEART RATE: 99 BPM | SYSTOLIC BLOOD PRESSURE: 120 MMHG | TEMPERATURE: 98.3 F

## 2022-01-05 PROCEDURE — 99213 OFFICE O/P EST LOW 20 MIN: CPT

## 2022-01-05 NOTE — PLAN
[FreeTextEntry1] : SUPPORTIVE CARE: REST, FLUIDS, STEAM\par WILL SWAB FOR FLU, COVID, RSV\par ISOLATION PENDING RESULTS\par WANTS TO AVOID TAKING MEDICATION WHILE BREASTFEEDING\par USE OF SALINE NASAL SPRAY\par TYLENOL PRN\par CALL WITH ANY QUESTIONS, CONCERNS OR CHANGES OR IF SYMPTOMS PERSIST/WORSEN\par AWARE WHEN TO SEEK EMERGENT CARE

## 2022-01-05 NOTE — HISTORY OF PRESENT ILLNESS
[FreeTextEntry8] : MS. ARAUZ IS A PLEASANT 25 YO PRESENTING FOR ACUTE VISIT.  HAS HAD A COUGH 3 WEEKS.  NOT WORSENING.  PHLEGM.  CONGESTION .  POSTNASAL DRIP WAS BETTER AND THEN WORSE AGAIN.  YESTERDAY DIARRHEA AND VOMITING.  NO FEVER.  CHILLS.  TIRED YESTERDAY.  ACHY TODAY.  NO SORE THROAT.  NO LOSS TASTE SMELL OR RASHES.  NO CHEST PAIN OR SHORTNESS OF BREATH. SISTER WAS SICK.  TAKING PRENATAL VITAMINS.  LMP: ONE WEEK AGO.  BREASTFEEDING.

## 2022-01-05 NOTE — REVIEW OF SYSTEMS
[Fatigue] : fatigue [Cough] : cough [Negative] : Cardiovascular [Fever] : no fever [Shortness Of Breath] : no shortness of breath [Wheezing] : no wheezing [FreeTextEntry4] : SEE HPI [FreeTextEntry7] : SEE HPI

## 2022-01-05 NOTE — PHYSICAL EXAM
[No Acute Distress] : no acute distress [Well Nourished] : well nourished [Well-Appearing] : well-appearing [Normal Outer Ear/Nose] : the outer ears and nose were normal in appearance [Normal Oropharynx] : the oropharynx was normal [Normal TMs] : both tympanic membranes were normal [No Lymphadenopathy] : no lymphadenopathy [Supple] : supple [No Respiratory Distress] : no respiratory distress  [No Accessory Muscle Use] : no accessory muscle use [Clear to Auscultation] : lungs were clear to auscultation bilaterally [Normal Rate] : normal rate  [Regular Rhythm] : with a regular rhythm [Normal S1, S2] : normal S1 and S2

## 2022-01-07 LAB
INFLUENZA A RESULT: NOT DETECTED
INFLUENZA B RESULT: NOT DETECTED
RESP SYN VIRUS RESULT: NOT DETECTED
SARS-COV-2 RESULT: NOT DETECTED

## 2022-01-24 ENCOUNTER — APPOINTMENT (OUTPATIENT)
Dept: DERMATOLOGY | Facility: CLINIC | Age: 27
End: 2022-01-24
Payer: MEDICAID

## 2022-01-24 DIAGNOSIS — D22.9 MELANOCYTIC NEVI, UNSPECIFIED: ICD-10-CM

## 2022-01-24 PROCEDURE — 99213 OFFICE O/P EST LOW 20 MIN: CPT

## 2022-01-25 PROBLEM — D22.9 BENIGN NEVUS OF SKIN: Status: ACTIVE | Noted: 2022-01-25

## 2022-03-11 NOTE — OB RN DELIVERY SUMMARY - NSSTERILIZETYPE_OBGYN_ALL_OB
Impression: Primary open-angle glaucoma, mild stage, bilateral Plan: PT HAS POAG  OU     GONIO:  SS OU   PACHS: 530 //555   (01/25/11) PT  REFERRED BY DR. WHITE OD
S/P SLT OD 03/06/20 //  S/P SLT OS 03/20/20 S/P LPI OD 2/11/11 // /S/P LPI 02/22/11 PT DENIES LUNG DZ
PT DENIES SULFA ALLERGY
TARGET IOP MID TEENS OR LESS OU
RECOMMEND 1. CONTINUE LATANOPROST OU QPM
2. CONTINUE TIMOLOL 1/2 OU QAM (STARTED 2/4/14) 3.  SCHEDULE FOLLOW UP IN 6-9 MONTHS WITH VF
None

## 2022-04-09 ENCOUNTER — LABORATORY RESULT (OUTPATIENT)
Age: 27
End: 2022-04-09

## 2022-04-13 ENCOUNTER — APPOINTMENT (OUTPATIENT)
Dept: FAMILY MEDICINE | Facility: CLINIC | Age: 27
End: 2022-04-13
Payer: MEDICAID

## 2022-04-13 VITALS
DIASTOLIC BLOOD PRESSURE: 72 MMHG | HEIGHT: 66 IN | HEART RATE: 86 BPM | BODY MASS INDEX: 30.53 KG/M2 | SYSTOLIC BLOOD PRESSURE: 120 MMHG | WEIGHT: 190 LBS | OXYGEN SATURATION: 98 %

## 2022-04-13 DIAGNOSIS — M25.532 PAIN IN LEFT WRIST: ICD-10-CM

## 2022-04-13 DIAGNOSIS — Z87.898 PERSONAL HISTORY OF OTHER SPECIFIED CONDITIONS: ICD-10-CM

## 2022-04-13 DIAGNOSIS — S69.90XA UNSPECIFIED INJURY OF UNSPECIFIED WRIST, HAND AND FINGER(S), INITIAL ENCOUNTER: ICD-10-CM

## 2022-04-13 LAB
25(OH)D3 SERPL-MCNC: 31.8 NG/ML
ALBUMIN SERPL ELPH-MCNC: 4.5 G/DL
ALP BLD-CCNC: 115 U/L
ALT SERPL-CCNC: 17 U/L
ANION GAP SERPL CALC-SCNC: 13 MMOL/L
APPEARANCE: ABNORMAL
AST SERPL-CCNC: 20 U/L
BASOPHILS # BLD AUTO: 0.04 K/UL
BASOPHILS NFR BLD AUTO: 0.6 %
BILIRUB SERPL-MCNC: 0.5 MG/DL
BILIRUBIN URINE: NEGATIVE
BLOOD URINE: NEGATIVE
BUN SERPL-MCNC: 6 MG/DL
CALCIUM SERPL-MCNC: 9.3 MG/DL
CHLORIDE SERPL-SCNC: 103 MMOL/L
CHOLEST SERPL-MCNC: 151 MG/DL
CO2 SERPL-SCNC: 26 MMOL/L
COLOR: YELLOW
CREAT SERPL-MCNC: 0.77 MG/DL
EGFR: 108 ML/MIN/1.73M2
EOSINOPHIL # BLD AUTO: 0.16 K/UL
EOSINOPHIL NFR BLD AUTO: 2.5 %
ESTIMATED AVERAGE GLUCOSE: 88 MG/DL
GLUCOSE QUALITATIVE U: NEGATIVE
GLUCOSE SERPL-MCNC: 88 MG/DL
HBA1C MFR BLD HPLC: 4.7 %
HCT VFR BLD CALC: 38.8 %
HDLC SERPL-MCNC: 48 MG/DL
HGB BLD-MCNC: 12.4 G/DL
IMM GRANULOCYTES NFR BLD AUTO: 0.2 %
KETONES URINE: NEGATIVE
LDLC SERPL CALC-MCNC: 80 MG/DL
LEUKOCYTE ESTERASE URINE: ABNORMAL
LYMPHOCYTES # BLD AUTO: 2.16 K/UL
LYMPHOCYTES NFR BLD AUTO: 33.1 %
MAN DIFF?: NORMAL
MCHC RBC-ENTMCNC: 29.2 PG
MCHC RBC-ENTMCNC: 32 GM/DL
MCV RBC AUTO: 91.5 FL
MONOCYTES # BLD AUTO: 0.37 K/UL
MONOCYTES NFR BLD AUTO: 5.7 %
NEUTROPHILS # BLD AUTO: 3.79 K/UL
NEUTROPHILS NFR BLD AUTO: 57.9 %
NITRITE URINE: NEGATIVE
NONHDLC SERPL-MCNC: 103 MG/DL
PH URINE: 5.5
PLATELET # BLD AUTO: 312 K/UL
POTASSIUM SERPL-SCNC: 4.1 MMOL/L
PROT SERPL-MCNC: 6.9 G/DL
PROTEIN URINE: NEGATIVE
RBC # BLD: 4.24 M/UL
RBC # FLD: 12.3 %
SODIUM SERPL-SCNC: 141 MMOL/L
SPECIFIC GRAVITY URINE: 1.02
T4 FREE SERPL-MCNC: 1.1 NG/DL
TRIGL SERPL-MCNC: 115 MG/DL
TSH SERPL-ACNC: 1.13 UIU/ML
UROBILINOGEN URINE: NORMAL
WBC # FLD AUTO: 6.53 K/UL

## 2022-04-13 PROCEDURE — G0444 DEPRESSION SCREEN ANNUAL: CPT | Mod: 59

## 2022-04-13 PROCEDURE — 99213 OFFICE O/P EST LOW 20 MIN: CPT | Mod: 25

## 2022-04-21 ENCOUNTER — TRANSCRIPTION ENCOUNTER (OUTPATIENT)
Age: 27
End: 2022-04-21

## 2022-04-22 PROBLEM — S69.90XA INJURY, HAND: Status: RESOLVED | Noted: 2021-11-02 | Resolved: 2022-04-22

## 2022-04-22 PROBLEM — Z87.898 HISTORY OF POSTNASAL DRIP: Status: RESOLVED | Noted: 2022-01-05 | Resolved: 2022-04-22

## 2022-04-22 PROBLEM — M25.532 LEFT WRIST PAIN: Status: RESOLVED | Noted: 2021-11-02 | Resolved: 2022-04-22

## 2022-04-22 NOTE — PLAN
[FreeTextEntry1] : SEE PHQ-9\par STRESS REDUCTION EXERCISES\par BEHAVIORAL HEALTH REFERRAL\par WOULD LIKE TO SEE THERAPIST\par DOES NOT WANT TO TAKE MEDICATION BUT DILAN WILL CONSIDER\par SEEING OB/GYN TOMORROW AS WELL\par IF SUICIDAL/HOMICIDAL IDEATIONS OR PLANS CALL 911 OR GO TO ER IMMEDIATELY \par FOLLOW-UP FOR MCL\par CALL WITH ANY QUESTIONS, CONCERNS OR CHANGES \par SUPPORT GIVEN

## 2022-04-22 NOTE — PHYSICAL EXAM
[No Acute Distress] : no acute distress [Well Nourished] : well nourished [Well-Appearing] : well-appearing [No Respiratory Distress] : no respiratory distress  [No Accessory Muscle Use] : no accessory muscle use [Clear to Auscultation] : lungs were clear to auscultation bilaterally [Normal Rate] : normal rate  [Regular Rhythm] : with a regular rhythm [Normal S1, S2] : normal S1 and S2 [Speech Grossly Normal] : speech grossly normal [Memory Grossly Normal] : memory grossly normal [Normal Affect] : the affect was normal [Normal Mood] : the mood was normal

## 2022-04-22 NOTE — HISTORY OF PRESENT ILLNESS
[FreeTextEntry1] : ANXIOUS [de-identified] : MS. ARAUZ IS A PLEASANT 28 YO PRESENTING FOR FOLLOW-UP.  HAD A BABY BOY SIX MONTHS AGO.  NOT BREASTFEEDING.  PLANS TO HAVE LIPOSUCTION NEXT MONTH - WILL FOLLOW-UP FOR MCL.  IS SEEING OB/GYN TOMORROW DR. ANGEL.  STATES THAT SHE FELT BAD WITH LAST PREGNANCY 2019.  FELT BETTER A YEAR AFTER.  NEVER ON MEDICATION FOR POSTPARTUM ANXIETY/DEPRESSION.  DOESN'T LIKE MEDICATION.  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  HAS . FINANCIALLY OK WITH NO CONCERNS FINANCIALLY.  IS GOING TO SCHOOL ONLINE AND MONDAY / WEDNESDAY.  DOES GET ANXIOUS.  NO PANIC ATTACKS.  DOES FEEL DOWN AT TIMES.  NO SUICIDAL/HOMICIDAL IDEATIONS OR PLANS.  WOULD LIKE TO SEE THERAPIST.

## 2022-04-27 ENCOUNTER — NON-APPOINTMENT (OUTPATIENT)
Age: 27
End: 2022-04-27

## 2022-04-27 ENCOUNTER — APPOINTMENT (OUTPATIENT)
Dept: FAMILY MEDICINE | Facility: CLINIC | Age: 27
End: 2022-04-27
Payer: MEDICAID

## 2022-04-27 VITALS
SYSTOLIC BLOOD PRESSURE: 118 MMHG | HEART RATE: 82 BPM | BODY MASS INDEX: 30.86 KG/M2 | OXYGEN SATURATION: 98 % | HEIGHT: 66 IN | DIASTOLIC BLOOD PRESSURE: 68 MMHG | WEIGHT: 192 LBS

## 2022-04-27 DIAGNOSIS — Z41.1 ENCOUNTER FOR COSMETIC SURGERY: ICD-10-CM

## 2022-04-27 DIAGNOSIS — Z01.818 ENCOUNTER FOR OTHER PREPROCEDURAL EXAMINATION: ICD-10-CM

## 2022-04-27 DIAGNOSIS — Z82.49 FAMILY HISTORY OF ISCHEMIC HEART DISEASE AND OTHER DISEASES OF THE CIRCULATORY SYSTEM: ICD-10-CM

## 2022-04-27 PROCEDURE — 99214 OFFICE O/P EST MOD 30 MIN: CPT | Mod: 25

## 2022-04-27 PROCEDURE — 93000 ELECTROCARDIOGRAM COMPLETE: CPT

## 2022-05-01 PROBLEM — Z41.1 ENCOUNTER FOR COSMETIC SURGERY: Status: ACTIVE | Noted: 2022-05-01

## 2022-05-01 PROBLEM — Z01.818 PREOP TESTING: Status: ACTIVE | Noted: 2022-04-27

## 2022-05-01 LAB
ALBUMIN SERPL ELPH-MCNC: 4.3 G/DL
ALP BLD-CCNC: 96 U/L
ALT SERPL-CCNC: 20 U/L
ANION GAP SERPL CALC-SCNC: 11 MMOL/L
APTT BLD: 34.6 SEC
AST SERPL-CCNC: 20 U/L
BASOPHILS # BLD AUTO: 0.02 K/UL
BASOPHILS NFR BLD AUTO: 0.4 %
BILIRUB SERPL-MCNC: 0.6 MG/DL
BUN SERPL-MCNC: 8 MG/DL
CALCIUM SERPL-MCNC: 8.8 MG/DL
CHLORIDE SERPL-SCNC: 108 MMOL/L
CO2 SERPL-SCNC: 24 MMOL/L
CREAT SERPL-MCNC: 0.75 MG/DL
EGFR: 111 ML/MIN/1.73M2
EOSINOPHIL # BLD AUTO: 0.18 K/UL
EOSINOPHIL NFR BLD AUTO: 3.4 %
GLUCOSE SERPL-MCNC: 99 MG/DL
HCG SERPL-MCNC: <1 MIU/ML
HCT VFR BLD CALC: 37.7 %
HGB BLD-MCNC: 11.7 G/DL
IMM GRANULOCYTES NFR BLD AUTO: 0.2 %
INR PPP: 0.97 RATIO
LYMPHOCYTES # BLD AUTO: 1.59 K/UL
LYMPHOCYTES NFR BLD AUTO: 29.7 %
MAN DIFF?: NORMAL
MCHC RBC-ENTMCNC: 29.3 PG
MCHC RBC-ENTMCNC: 31 GM/DL
MCV RBC AUTO: 94.3 FL
MONOCYTES # BLD AUTO: 0.34 K/UL
MONOCYTES NFR BLD AUTO: 6.4 %
NEUTROPHILS # BLD AUTO: 3.21 K/UL
NEUTROPHILS NFR BLD AUTO: 59.9 %
PLATELET # BLD AUTO: 281 K/UL
POTASSIUM SERPL-SCNC: 4.1 MMOL/L
PROT SERPL-MCNC: 6.2 G/DL
PT BLD: 11.4 SEC
RBC # BLD: 4 M/UL
RBC # FLD: 12.5 %
SODIUM SERPL-SCNC: 142 MMOL/L
WBC # FLD AUTO: 5.35 K/UL

## 2022-05-01 NOTE — PLAN
[FreeTextEntry1] : OPTIMIZED MEDICALLY FOR PROPOSED SURGEICAL PROCEDURE PENDING PRE-OPERATIVE TESTING REVIEW\par EKG: NSR AT 70 BPM, NO ACUTE ST-T WAVE CHANGES\par GOOD EXERCISE CAPACITY\par GI/DVT PROPHYLAXIS PER SURGERY\par AVOIDANCE OF NSAIDS, VITAMINS AND ASPIRIN CONTAINING PRODUCTS PRIOR TO SURGERY\par IS TO SEE THERAPIST\par CALL WITH ANY QUESTIONS, CONCERNS OR CHANGES PRIOR TO PROCEDURE\par SUPPORT PROVIDED\par FOLLOW-UP POST-OPERATIVELY \par \par ADDENDUM 5/1/22; PREOPERATIVE LAB WORK REVIEWED; OPTIMIZED MEDICALLY

## 2022-05-01 NOTE — HISTORY OF PRESENT ILLNESS
[No Pertinent Cardiac History] : no history of aortic stenosis, atrial fibrillation, coronary artery disease, recent myocardial infarction, or implantable device/pacemaker [No Pertinent Pulmonary History] : no history of asthma, COPD, sleep apnea, or smoking [No Adverse Anesthesia Reaction] : no adverse anesthesia reaction in self or family member [(Patient denies any chest pain, claudication, dyspnea on exertion, orthopnea, palpitations or syncope)] : Patient denies any chest pain, claudication, dyspnea on exertion, orthopnea, palpitations or syncope [Chronic Anticoagulation] : no chronic anticoagulation [Chronic Kidney Disease] : no chronic kidney disease [Diabetes] : no diabetes [FreeTextEntry1] : BBL [FreeTextEntry2] : May 19, 2022 [FreeTextEntry3] : Dr. Lawson [FreeTextEntry4] : MS. ARAUZ IS A PLEASANT 28 YO PRESENTING FOR MEDICAL CLEARANCE FOR UPCOMING COSMETIC LIPOSUCTION PROCEDURE.  IS FEELING WELL TODAY.  NO HISTORY OF MI, STROKE OR SEIZURE.  NO PERSONAL OR FAMILY HISTORY OF BLOOD OR CLOTTING DISORDERS.  DENIES EASY BLEEDING OR BRUISING.  IS ABLE TO WALK MULTIPLE BLOCKS AND GO UP MULTIPLE FLIGHTS OF STAIRS WITHOUT DIFFICULTY.   HAS HAD NO HEADACHE, DIZZINESS, CHEST PAIN, SHORTNESS OF BREATH, GI OR URINARY COMPLAINTS.  NO OTHER COMPLAINTS TODAY.  UPCOMING THERAPY APPOINTMENT ON MAY 20TH.  \par LMP: 4.25.22

## 2022-05-20 ENCOUNTER — TRANSCRIPTION ENCOUNTER (OUTPATIENT)
Age: 27
End: 2022-05-20

## 2022-06-10 ENCOUNTER — NON-APPOINTMENT (OUTPATIENT)
Age: 27
End: 2022-06-10

## 2022-06-13 ENCOUNTER — TRANSCRIPTION ENCOUNTER (OUTPATIENT)
Age: 27
End: 2022-06-13

## 2022-12-01 ENCOUNTER — APPOINTMENT (OUTPATIENT)
Dept: FAMILY MEDICINE | Facility: CLINIC | Age: 27
End: 2022-12-01

## 2022-12-01 VITALS
HEART RATE: 85 BPM | HEIGHT: 66 IN | OXYGEN SATURATION: 98 % | WEIGHT: 180 LBS | BODY MASS INDEX: 28.93 KG/M2 | SYSTOLIC BLOOD PRESSURE: 104 MMHG | DIASTOLIC BLOOD PRESSURE: 66 MMHG

## 2022-12-01 DIAGNOSIS — F32.A DEPRESSION, UNSPECIFIED: ICD-10-CM

## 2022-12-01 PROCEDURE — G0444 DEPRESSION SCREEN ANNUAL: CPT | Mod: 59

## 2022-12-01 PROCEDURE — 99213 OFFICE O/P EST LOW 20 MIN: CPT | Mod: 25

## 2022-12-01 RX ORDER — CHROMIUM 200 MCG
TABLET ORAL
Refills: 0 | Status: DISCONTINUED | COMMUNITY
End: 2022-12-01

## 2022-12-01 RX ORDER — PSYLLIUM HUSK 0.4 G
CAPSULE ORAL
Refills: 0 | Status: DISCONTINUED | COMMUNITY
End: 2022-12-01

## 2022-12-01 RX ORDER — IRON/IRON ASP GLY/FA/MV-MIN 38 125-25-1MG
TABLET ORAL
Refills: 0 | Status: DISCONTINUED | COMMUNITY
End: 2022-12-01

## 2022-12-01 RX ORDER — ACETAMINOPHEN 325 MG
TABLET ORAL
Refills: 0 | Status: DISCONTINUED | COMMUNITY
End: 2022-12-01

## 2022-12-01 RX ORDER — MULTIVIT-MIN/IRON/FOLIC ACID/K 18-600-40
CAPSULE ORAL
Refills: 0 | Status: DISCONTINUED | COMMUNITY
End: 2022-12-01

## 2022-12-11 PROBLEM — F32.A DEPRESSION: Status: ACTIVE | Noted: 2022-04-13

## 2022-12-11 NOTE — PLAN
[FreeTextEntry1] : REFERRAL TO BEHAVIORAL HEALTH\par THERAPY\par STRESS REDUCTION EXERCISES\par DOES NOT WANT MEDICATION AT THIS TIME BUT WILL CONSIDER IF SYMPTOMS PERSIST/WORSEN\par IF SUICIDAL/HOMICIDAL IDEATIONS OR PLANS CALL 911 OR GO TO ER IMMEDIATELY \par GYN\par CALL WITH ANY QUESTIONS, CONCERNS OR CHANGES \par FOLLOW-UP FOR CPE

## 2022-12-11 NOTE — HEALTH RISK ASSESSMENT
[1] : 2) Feeling down, depressed, or hopeless for several days (1) [PHQ-2 Positive] : PHQ-2 Positive [Nearly Every Day (3)] : 5.) Poor appetite or overeating? Nearly every day [Several Days (1)] : 7.) Trouble concentrating on things, such as reading a newspaper or watching television? Several days [Moderate] : severity of depression is moderate [PHQ-9 Positive] : PHQ-9 Positive [I have developed a follow-up plan documented below in the note.] : I have developed a follow-up plan documented below in the note. [Not at All (0)] : 9.) Thoughts that you would be off dead or of hurting yourself in some way? Not at all [[id number]] : 2 [[id number]] : 11

## 2022-12-11 NOTE — HISTORY OF PRESENT ILLNESS
[FreeTextEntry8] : MS. ARAUZ IS A PLEASANT 28 YO PRESENTING FOR ACUTE VISIT.  THREE WEEKS AGO WAS HOLDING BABY CAR SEAT AND HEARD A POP.  WENT [facility] AND HAD MRI OF LEFT WRIST.  ADVISED [facility] AND GIVEN BRACE FOR SIX WEEKS AND NSAID AND WILL FOLLOW-UP WITH THEM.  NEEDS REFERRAL TO GYN.  IS LOOKING FOR A NEW PROVIDER.  WOULD LIKE TO TALK TO THERAPIST.  HAS NEVER BEEN ON MEDICATION IN THE PAST.  DOES FEEL DOWN AT TIMES AND TROUBLE SLEEPING.  ALSO SOMETIMES TROUBLE CONCENTRATING.  NO SUICIDAL/HOMICIDAL IDEATIONS OR PLANS.\par TRYING TO LOOSE WEIGHT.  \par LMP: 11/14/22

## 2022-12-15 ENCOUNTER — TRANSCRIPTION ENCOUNTER (OUTPATIENT)
Age: 27
End: 2022-12-15

## 2023-02-13 ENCOUNTER — APPOINTMENT (OUTPATIENT)
Dept: DERMATOLOGY | Facility: CLINIC | Age: 28
End: 2023-02-13

## 2023-02-16 ENCOUNTER — APPOINTMENT (OUTPATIENT)
Dept: DERMATOLOGY | Facility: CLINIC | Age: 28
End: 2023-02-16
Payer: MEDICAID

## 2023-02-16 PROCEDURE — 99214 OFFICE O/P EST MOD 30 MIN: CPT

## 2023-02-28 ENCOUNTER — NON-APPOINTMENT (OUTPATIENT)
Age: 28
End: 2023-02-28

## 2023-02-28 ENCOUNTER — APPOINTMENT (OUTPATIENT)
Dept: OBGYN | Facility: CLINIC | Age: 28
End: 2023-02-28
Payer: MEDICAID

## 2023-02-28 VITALS
HEIGHT: 66 IN | SYSTOLIC BLOOD PRESSURE: 120 MMHG | WEIGHT: 178 LBS | BODY MASS INDEX: 28.61 KG/M2 | DIASTOLIC BLOOD PRESSURE: 80 MMHG

## 2023-02-28 PROCEDURE — 99213 OFFICE O/P EST LOW 20 MIN: CPT

## 2023-02-28 NOTE — HISTORY OF PRESENT ILLNESS
[FreeTextEntry1] : 28 yo here for an opinion of a labial mass. SHe states that she noticed this lump in the labia since janary and it is bothersome. not painfull but she notices tht it is there .

## 2023-02-28 NOTE — DISCUSSION/SUMMARY
[FreeTextEntry1] : sent for sono of the labia \par disc with Dr. webster , she will follow up with her.

## 2023-02-28 NOTE — PHYSICAL EXAM
Patient calling for medication refill. Medication(s) set up as pending orders from medication list.    Preferred pharmacy has been set up and verified    [Chaperone Declined] : Patient declined chaperone [Appropriately responsive] : appropriately responsive [Alert] : alert [No Acute Distress] : no acute distress [No Lymphadenopathy] : no lymphadenopathy [Soft] : soft [Non-tender] : non-tender [Non-distended] : non-distended [No HSM] : No HSM [No Lesions] : no lesions [No Mass] : no mass [Oriented x3] : oriented x3 [Labia Majora] : normal [Labia Minora] : normal [Normal] : normal [FreeTextEntry1] : 3cm x1cm labial mass mobile not hard.

## 2023-03-01 ENCOUNTER — OUTPATIENT (OUTPATIENT)
Dept: OUTPATIENT SERVICES | Facility: HOSPITAL | Age: 28
LOS: 1 days | End: 2023-03-01
Payer: MEDICAID

## 2023-03-01 ENCOUNTER — APPOINTMENT (OUTPATIENT)
Dept: ULTRASOUND IMAGING | Facility: CLINIC | Age: 28
End: 2023-03-01

## 2023-03-01 ENCOUNTER — RESULT REVIEW (OUTPATIENT)
Age: 28
End: 2023-03-01

## 2023-03-01 DIAGNOSIS — N90.89 OTHER SPECIFIED NONINFLAMMATORY DISORDERS OF VULVA AND PERINEUM: ICD-10-CM

## 2023-03-01 DIAGNOSIS — Z98.890 OTHER SPECIFIED POSTPROCEDURAL STATES: Chronic | ICD-10-CM

## 2023-03-01 PROCEDURE — 76857 US EXAM PELVIC LIMITED: CPT

## 2023-03-01 PROCEDURE — 76857 US EXAM PELVIC LIMITED: CPT | Mod: 26

## 2023-03-02 ENCOUNTER — TRANSCRIPTION ENCOUNTER (OUTPATIENT)
Age: 28
End: 2023-03-02

## 2023-03-10 ENCOUNTER — APPOINTMENT (OUTPATIENT)
Dept: OBGYN | Facility: CLINIC | Age: 28
End: 2023-03-10

## 2023-03-22 ENCOUNTER — APPOINTMENT (OUTPATIENT)
Dept: GYNECOLOGIC ONCOLOGY | Facility: CLINIC | Age: 28
End: 2023-03-22
Payer: MEDICAID

## 2023-03-22 ENCOUNTER — NON-APPOINTMENT (OUTPATIENT)
Age: 28
End: 2023-03-22

## 2023-03-22 VITALS
BODY MASS INDEX: 28.77 KG/M2 | OXYGEN SATURATION: 98 % | HEART RATE: 64 BPM | WEIGHT: 179 LBS | RESPIRATION RATE: 16 BRPM | SYSTOLIC BLOOD PRESSURE: 120 MMHG | HEIGHT: 66 IN | DIASTOLIC BLOOD PRESSURE: 72 MMHG

## 2023-03-22 PROCEDURE — 99204 OFFICE O/P NEW MOD 45 MIN: CPT

## 2023-03-22 NOTE — HISTORY OF PRESENT ILLNESS
[FreeTextEntry1] : 28yo  LMP 3/14/2023 \par \par The patient presents today with referral from  [facility] for bilateral labial masses. Patient reports palpating R labial mass about 2-3 months ago which is uncomfortable when walking or sitting. She denies recent trauma to the area. The mass is not not painful or bleeding. She followed up initially with Dr Bahena who recommended observation of the masses. It continued to bother her and she believed it to be enlarging, so she instead saw a different provider, who ordered a sonogram to assess mass.\par \par US performed 3/1/23 revealed bilateral solid/cystic masses of the labia majora corresponding to the palpable findings. R side measured 5x 1.7 x 1.1cm and L side measured 3 x 1.4 x 1.1cm. They are described as isoechoic with solid component and numerous small cystic foci.\par \par The patient denies any significant changes in weight. She denies abdominal pain, constipation or changes in bladder habits. She has never noticed any problems like this in the past. \par \par LPAP- 2023 (normal) \par LMammo- N/A\par LColonoscopy- N/A\par LBone Density Scan- N/A\par

## 2023-03-22 NOTE — OB HISTORY
[Total Preg ___] : : [unfilled] [Full Term ___] : [unfilled] (full-term) [Definite ___ (Date)] : the last menstrual period was [unfilled]

## 2023-03-22 NOTE — CHIEF COMPLAINT
[FreeTextEntry1] : [facility]\par \par [facility] [phone] at [address] [place] [id number]\par

## 2023-03-22 NOTE — PHYSICAL EXAM
[Chaperone Present] : A chaperone was present in the examining room during all aspects of the physical examination [Normal] : Urethra: Normal, no discharge [Fully active, able to carry on all pre-disease performance without restriction] : Status 0 - Fully active, able to carry on all pre-disease performance without restriction [FreeTextEntry1] : Alesia Garcia PGY5 [de-identified] : bilateral labial masses palpated which are mobile and non tender. L mass with more solid component palpated. No evidence of infection. No overlying skin changes [de-identified] : Deferred

## 2023-03-22 NOTE — REVIEW OF SYSTEMS
[Negative] : Musculoskeletal [Hematuria] : no hematuria [Dysuria] : no dysuria [Vaginal Discharge] : no vaginal discharge [Abn Vag Bleeding] : no abnormal vaginal bleeding [Dyspareunia] : no dyspareunia [Incontinence] : no incontinence [Normal Sexual Function] : abnormal sexual function [FreeTextEntry4] : labial lump

## 2023-03-22 NOTE — PAST MEDICAL HISTORY
[Menstruating] : The patient is menstruating [Definite ___ (Date)] : the last menstrual period was [unfilled] [Regular Cycle Intervals] : have been regular [Total Preg ___] : G[unfilled] [Live Births ___] : P[unfilled]

## 2023-03-22 NOTE — END OF VISIT
[FreeTextEntry3] : Written by Alesia Garcia PGY5, acting as scribe for Dr. Marcelo Solis.\par  [FreeTextEntry2] : This note accurately reflects the work and decisions made by me.\par

## 2023-03-22 NOTE — ASSESSMENT
[FreeTextEntry1] : In summary this is a 26yo F LMP 3/14/23 with bilateral labial cystic masses. Discussed exam findings with patiend and reviewed ultrasound report findings as well. Differential diagnosis includes hydrocele (via Canal of Nuck) vs lipoma or other soft tissue tumor within the canal of Nuk. The solid area on the left labia more consistent with a tumor and right likely just fluid filled. Low suspicion for malignancy discussed with patient. We recommended MRI abdomen/ pelvis to evaluate if cysts are contiguous with abdominal cavity which may suggest a herniation. A hernia repair may be recommended if these are cysts of canal of Nuk at the time of surgical excision. The patient was counseled that after MRI performed we can discuss management plan for removal of bothersome cysts. \par \par All questions answered to patient satisfaction. Pt provided with list of imaging centers to schedule MRI. To  to schedule follow up

## 2023-04-04 ENCOUNTER — APPOINTMENT (OUTPATIENT)
Dept: MRI IMAGING | Facility: CLINIC | Age: 28
End: 2023-04-04
Payer: MEDICAID

## 2023-04-04 ENCOUNTER — OUTPATIENT (OUTPATIENT)
Dept: OUTPATIENT SERVICES | Facility: HOSPITAL | Age: 28
LOS: 1 days | End: 2023-04-04
Payer: MEDICAID

## 2023-04-04 DIAGNOSIS — N90.89 OTHER SPECIFIED NONINFLAMMATORY DISORDERS OF VULVA AND PERINEUM: ICD-10-CM

## 2023-04-04 DIAGNOSIS — Z98.890 OTHER SPECIFIED POSTPROCEDURAL STATES: Chronic | ICD-10-CM

## 2023-04-04 PROCEDURE — A9585: CPT

## 2023-04-04 PROCEDURE — 72197 MRI PELVIS W/O & W/DYE: CPT

## 2023-04-04 PROCEDURE — 72197 MRI PELVIS W/O & W/DYE: CPT | Mod: 26

## 2023-04-05 ENCOUNTER — APPOINTMENT (OUTPATIENT)
Dept: GYNECOLOGIC ONCOLOGY | Facility: CLINIC | Age: 28
End: 2023-04-05
Payer: MEDICAID

## 2023-04-05 ENCOUNTER — NON-APPOINTMENT (OUTPATIENT)
Age: 28
End: 2023-04-05

## 2023-04-18 ENCOUNTER — NON-APPOINTMENT (OUTPATIENT)
Age: 28
End: 2023-04-18

## 2023-04-20 ENCOUNTER — APPOINTMENT (OUTPATIENT)
Dept: GYNECOLOGIC ONCOLOGY | Facility: CLINIC | Age: 28
End: 2023-04-20
Payer: MEDICAID

## 2023-04-20 DIAGNOSIS — D17.9 BENIGN LIPOMATOUS NEOPLASM, UNSPECIFIED: ICD-10-CM

## 2023-04-20 PROCEDURE — 99213 OFFICE O/P EST LOW 20 MIN: CPT

## 2023-05-03 ENCOUNTER — OUTPATIENT (OUTPATIENT)
Dept: OUTPATIENT SERVICES | Facility: HOSPITAL | Age: 28
LOS: 1 days | End: 2023-05-03
Payer: MEDICAID

## 2023-05-03 DIAGNOSIS — Z01.818 ENCOUNTER FOR OTHER PREPROCEDURAL EXAMINATION: ICD-10-CM

## 2023-05-03 DIAGNOSIS — Z98.890 OTHER SPECIFIED POSTPROCEDURAL STATES: Chronic | ICD-10-CM

## 2023-05-03 LAB
A1C WITH ESTIMATED AVERAGE GLUCOSE RESULT: 4.1 % — SIGNIFICANT CHANGE UP (ref 4–5.6)
ANION GAP SERPL CALC-SCNC: 12 MMOL/L — SIGNIFICANT CHANGE UP (ref 5–17)
APTT BLD: 30 SEC — SIGNIFICANT CHANGE UP (ref 27.5–35.5)
BASOPHILS # BLD AUTO: 0.03 K/UL — SIGNIFICANT CHANGE UP (ref 0–0.2)
BASOPHILS NFR BLD AUTO: 0.3 % — SIGNIFICANT CHANGE UP (ref 0–2)
BUN SERPL-MCNC: 11.4 MG/DL — SIGNIFICANT CHANGE UP (ref 8–20)
CALCIUM SERPL-MCNC: 8.7 MG/DL — SIGNIFICANT CHANGE UP (ref 8.4–10.5)
CHLORIDE SERPL-SCNC: 104 MMOL/L — SIGNIFICANT CHANGE UP (ref 96–108)
CO2 SERPL-SCNC: 23 MMOL/L — SIGNIFICANT CHANGE UP (ref 22–29)
CREAT SERPL-MCNC: 0.65 MG/DL — SIGNIFICANT CHANGE UP (ref 0.5–1.3)
EGFR: 122 ML/MIN/1.73M2 — SIGNIFICANT CHANGE UP
EOSINOPHIL # BLD AUTO: 0.33 K/UL — SIGNIFICANT CHANGE UP (ref 0–0.5)
EOSINOPHIL NFR BLD AUTO: 3.3 % — SIGNIFICANT CHANGE UP (ref 0–6)
ESTIMATED AVERAGE GLUCOSE: 71 MG/DL — SIGNIFICANT CHANGE UP (ref 68–114)
GLUCOSE SERPL-MCNC: 86 MG/DL — SIGNIFICANT CHANGE UP (ref 70–99)
HCG SERPL-ACNC: <4 MIU/ML — SIGNIFICANT CHANGE UP
HCT VFR BLD CALC: 35.1 % — SIGNIFICANT CHANGE UP (ref 34.5–45)
HGB BLD-MCNC: 11.9 G/DL — SIGNIFICANT CHANGE UP (ref 11.5–15.5)
IMM GRANULOCYTES NFR BLD AUTO: 0.3 % — SIGNIFICANT CHANGE UP (ref 0–0.9)
INR BLD: 0.97 RATIO — SIGNIFICANT CHANGE UP (ref 0.88–1.16)
LYMPHOCYTES # BLD AUTO: 1.66 K/UL — SIGNIFICANT CHANGE UP (ref 1–3.3)
LYMPHOCYTES # BLD AUTO: 16.8 % — SIGNIFICANT CHANGE UP (ref 13–44)
MCHC RBC-ENTMCNC: 29.8 PG — SIGNIFICANT CHANGE UP (ref 27–34)
MCHC RBC-ENTMCNC: 33.9 GM/DL — SIGNIFICANT CHANGE UP (ref 32–36)
MCV RBC AUTO: 87.8 FL — SIGNIFICANT CHANGE UP (ref 80–100)
MONOCYTES # BLD AUTO: 0.48 K/UL — SIGNIFICANT CHANGE UP (ref 0–0.9)
MONOCYTES NFR BLD AUTO: 4.9 % — SIGNIFICANT CHANGE UP (ref 2–14)
NEUTROPHILS # BLD AUTO: 7.34 K/UL — SIGNIFICANT CHANGE UP (ref 1.8–7.4)
NEUTROPHILS NFR BLD AUTO: 74.4 % — SIGNIFICANT CHANGE UP (ref 43–77)
PLATELET # BLD AUTO: 248 K/UL — SIGNIFICANT CHANGE UP (ref 150–400)
POTASSIUM SERPL-MCNC: 3.3 MMOL/L — LOW (ref 3.5–5.3)
POTASSIUM SERPL-SCNC: 3.3 MMOL/L — LOW (ref 3.5–5.3)
PROTHROM AB SERPL-ACNC: 11.3 SEC — SIGNIFICANT CHANGE UP (ref 10.5–13.4)
RBC # BLD: 4 M/UL — SIGNIFICANT CHANGE UP (ref 3.8–5.2)
RBC # FLD: 12.7 % — SIGNIFICANT CHANGE UP (ref 10.3–14.5)
SODIUM SERPL-SCNC: 139 MMOL/L — SIGNIFICANT CHANGE UP (ref 135–145)
WBC # BLD: 9.87 K/UL — SIGNIFICANT CHANGE UP (ref 3.8–10.5)
WBC # FLD AUTO: 9.87 K/UL — SIGNIFICANT CHANGE UP (ref 3.8–10.5)

## 2023-05-03 PROCEDURE — 85610 PROTHROMBIN TIME: CPT

## 2023-05-03 PROCEDURE — 85730 THROMBOPLASTIN TIME PARTIAL: CPT

## 2023-05-03 PROCEDURE — G0463: CPT

## 2023-05-03 PROCEDURE — 85025 COMPLETE CBC W/AUTO DIFF WBC: CPT

## 2023-05-03 PROCEDURE — 36415 COLL VENOUS BLD VENIPUNCTURE: CPT

## 2023-05-03 PROCEDURE — 80048 BASIC METABOLIC PNL TOTAL CA: CPT

## 2023-05-03 PROCEDURE — 84702 CHORIONIC GONADOTROPIN TEST: CPT

## 2023-05-03 PROCEDURE — 83036 HEMOGLOBIN GLYCOSYLATED A1C: CPT

## 2023-05-03 NOTE — CHART NOTE - NSCHARTNOTEFT_GEN_A_CORE
K+ 3.3, spoke to the patient , she said she drink a gallon of water daily, asked her to consume potasium rich food(banana, orange, potato) moderately, faxed the results to [facility].

## 2023-05-07 ENCOUNTER — NON-APPOINTMENT (OUTPATIENT)
Age: 28
End: 2023-05-07

## 2023-05-07 LAB — POTASSIUM SERPL-SCNC: 3.9 MMOL/L

## 2023-05-08 ENCOUNTER — RESULT REVIEW (OUTPATIENT)
Age: 28
End: 2023-05-08

## 2023-05-08 NOTE — ASSESSMENT
[FreeTextEntry1] : 29 yo pt with bilateral labial masses consistent with bilateral lipomas on MRI. The patient would like to have them removed. We discussed benign nature of lipomas but they can increase in size. We will schedule for surgical excision of bilateral labial lipomas as an outpatient procedure. We discussed risk of injury to surrounding structures, bleeding, infection and pain risk.

## 2023-05-08 NOTE — END OF VISIT
[FreeTextEntry3] : Patient consented for bilateral labial resection of lipomas \par PCP, CBC, pregnancy test\par  [FreeTextEntry2] : Kathy Rosales MA was present the entire duration of the patient interaction and gynecological exam.\par

## 2023-05-08 NOTE — HISTORY OF PRESENT ILLNESS
[FreeTextEntry1] : 29 yo pt with bilateral labial masses.  Patient reports palpating R labial mass about 2-3 months ago which is uncomfortable when walking or sitting. She denies recent trauma to the area. The mass is not not painful or bleeding. She followed up initially with Dr Bahena who recommended observation of the masses. It continued to bother her and she believed it to be enlarging, so she instead saw a different provider, who ordered a sonogram to assess mass. At our last visit we discussed exam findings and reviewed ultrasound report findings as well. Differential diagnosis includes hydrocele (via Canal of Nuck) vs lipoma or other soft tissue tumor within the canal of Nuk. The solid area on the left labia more consistent with a tumor and right likely just fluid filled. Low suspicion for malignancy discussed with patient. We recommended MRI abdomen/ pelvis to evaluate if cysts are contiguous with abdominal cavity which may suggest a herniation. A hernia repair may be recommended if these are cysts of canal of Nuk at the time of surgical excision. The patient was counseled that after MRI performed we can discuss management plan for removal of bothersome cysts. Pt presents to office for a discussion. \par \par MRI Pelvis 04/04/2023: IMPRESSION:\par Bilateral labial lesions are identified when compared to prior ultrasound which contain fat suggesting bilateral labial lipomas. No fluid or hernia could be seen in the region. There is no evidence of a Bartholin cyst. No aggressive features were appreciated on MRI\par \par Interval HIstory: no new gynecologic complaints.\par \par

## 2023-05-08 NOTE — REASON FOR VISIT
[FreeTextEntry1] : [place] Location \par [facility] \par  \par [address] \par [facility], [id number] \par [phone]\par

## 2023-05-24 ENCOUNTER — APPOINTMENT (OUTPATIENT)
Dept: GYNECOLOGIC ONCOLOGY | Facility: CLINIC | Age: 28
End: 2023-05-24
Payer: MEDICAID

## 2023-05-24 ENCOUNTER — NON-APPOINTMENT (OUTPATIENT)
Age: 28
End: 2023-05-24

## 2023-05-24 PROCEDURE — 99213 OFFICE O/P EST LOW 20 MIN: CPT

## 2023-05-24 NOTE — ASSESSMENT
[FreeTextEntry1] : 29 yo pt is s/p Bilateral vulvar excision of lipoma for bilateral labial lipoma on 05/08/2023. She reports she is recovering well. Sutures still in place, no discharge, some soarness. We discussed not shaving or waxing for now and giving it a full month or two to heal. \par \par Patient also wanted to discuss IUD birth controls. We reviewed options including copper IUD, and Levonorgesterole containing IUD options with different lengths of use. We discussed effectiveness as brith control, but also reduces risk of uterine cancer and can cause amenorrhea. She needs to continue cervical cancer screening with PAP as the IUD has no impact on cervical cancer.

## 2023-05-24 NOTE — END OF VISIT
[FreeTextEntry3] : Discharge to routine gynecologic care [FreeTextEntry2] : Justine Abreu MA was present the entire duration of the patient interaction and gynecological exam.\par

## 2023-05-24 NOTE — DISCUSSION/SUMMARY
[Doing Well] : is doing well [Excellent Pain Control] : has excellent pain control [No Sign of Infection] : is showing no signs of infection [FreeTextEntry1] : Pertinent Findings: Bilateral upper labia with mobile masses. Right with a 4x3 mass and Left with a 3x2 cm mass. No additional concerning findings. \par \par Final Diagnosis\par 1. Right labial "lipoma", excision:\par - Benign, mature adipose tissue with large area of fat necrosis (see\par note).\par \par \par 2. Left labial "lipoma", excision:\par - Benign, mature adipose tissue with large area of fat necrosis (see\par note).\par \par Note: The findings are compatible with lipoma with fat necrosis.\par

## 2023-05-24 NOTE — REASON FOR VISIT
[de-identified] : 05/08/2023 [de-identified] : Bilateral vulvar excision of lipoma for bilateral labial lipoma at [facility]   [de-identified] : Denies any SOB, abnormal pain or VB. Bowel and bladder function are wnl. Patient states she feels well from a gynecological stand point. Recovered well.

## 2023-06-22 ENCOUNTER — APPOINTMENT (OUTPATIENT)
Dept: GYNECOLOGIC ONCOLOGY | Facility: CLINIC | Age: 28
End: 2023-06-22
Payer: MEDICAID

## 2023-06-22 VITALS — OXYGEN SATURATION: 98 % | HEART RATE: 73 BPM | SYSTOLIC BLOOD PRESSURE: 119 MMHG | DIASTOLIC BLOOD PRESSURE: 75 MMHG

## 2023-06-22 DIAGNOSIS — N90.89 OTHER SPECIFIED NONINFLAMMATORY DISORDERS OF VULVA AND PERINEUM: ICD-10-CM

## 2023-06-22 PROCEDURE — 56605 BIOPSY OF VULVA/PERINEUM: CPT

## 2023-06-22 NOTE — REASON FOR VISIT
[FreeTextEntry1] : [facility]\par \par [facility] [phone] at [address] [place] [id number]\par

## 2023-06-22 NOTE — ASSESSMENT
[FreeTextEntry1] : 29yo s/p bilateral vulvar excision of lipomas on 5/8/23 returns today with complaint of a "red flap of skin sticking out" from left vulvar incision. On exam, let vulvar lesion c/w granulation tissue. Lesion removed and sent for biopsy. \par \par Will call pt with biopsy results.

## 2023-06-22 NOTE — HISTORY OF PRESENT ILLNESS
[FreeTextEntry1] : 29yo s/p bilateral vulvar excision of lipomas on 5/8/23 returns today with complaint of a "red flap of skin sticking out" from left vulvar incision. Overall, she feels well. Denies pain, fever.

## 2023-06-22 NOTE — PHYSICAL EXAM
[Chaperone Present] : A chaperone was present in the examining room during all aspects of the physical examination [Abnormal] : External genitalia: Abnormal [Normal] : Urethra: Normal, no discharge [FreeTextEntry1] : Jenny Dahl was present as chaperone during the entire physical examination. [de-identified] : left vulvar incision with erythematous 2cm lesion c/w granulation tissue protruding from left vulvar incision

## 2023-06-22 NOTE — PROCEDURE
[Post procedure instructions and information given] : post procedure instructions and information given and reviewed with patient. [0] : 0

## 2023-10-04 ENCOUNTER — APPOINTMENT (OUTPATIENT)
Dept: DERMATOLOGY | Facility: CLINIC | Age: 28
End: 2023-10-04
Payer: MEDICAID

## 2023-10-04 PROCEDURE — 99214 OFFICE O/P EST MOD 30 MIN: CPT

## 2024-01-29 ENCOUNTER — APPOINTMENT (OUTPATIENT)
Dept: FAMILY MEDICINE | Facility: CLINIC | Age: 29
End: 2024-01-29
Payer: MEDICAID

## 2024-01-29 ENCOUNTER — LABORATORY RESULT (OUTPATIENT)
Age: 29
End: 2024-01-29

## 2024-01-29 VITALS
BODY MASS INDEX: 30.7 KG/M2 | HEART RATE: 77 BPM | DIASTOLIC BLOOD PRESSURE: 74 MMHG | SYSTOLIC BLOOD PRESSURE: 124 MMHG | HEIGHT: 66 IN | WEIGHT: 191 LBS | OXYGEN SATURATION: 98 %

## 2024-01-29 DIAGNOSIS — Z00.00 ENCOUNTER FOR GENERAL ADULT MEDICAL EXAMINATION W/OUT ABNORMAL FINDINGS: ICD-10-CM

## 2024-01-29 PROCEDURE — G0444 DEPRESSION SCREEN ANNUAL: CPT | Mod: 59

## 2024-01-29 PROCEDURE — 99395 PREV VISIT EST AGE 18-39: CPT

## 2024-01-29 RX ORDER — MULTIVIT-MIN/IRON/FOLIC ACID/K 18-600-40
CAPSULE ORAL
Refills: 0 | Status: ACTIVE | COMMUNITY

## 2024-01-29 RX ORDER — MULTIVITAMIN
CAPSULE ORAL
Refills: 0 | Status: ACTIVE | COMMUNITY

## 2024-01-29 RX ORDER — ZINC SULFATE 50(220)MG
CAPSULE ORAL
Refills: 0 | Status: ACTIVE | COMMUNITY

## 2024-01-29 RX ORDER — DICLOFENAC SODIUM 75 MG/1
75 TABLET, DELAYED RELEASE ORAL
Qty: 60 | Refills: 0 | Status: DISCONTINUED | COMMUNITY
Start: 2022-11-13 | End: 2024-01-29

## 2024-01-29 RX ORDER — MAGNESIUM OXIDE/MAG AA CHELATE 300 MG
CAPSULE ORAL
Refills: 0 | Status: ACTIVE | COMMUNITY

## 2024-01-29 RX ORDER — PNV NO.95/FERROUS FUM/FOLIC AC 28MG-0.8MG
TABLET ORAL
Refills: 0 | Status: ACTIVE | COMMUNITY

## 2024-01-29 RX ORDER — CHOLECALCIFEROL (VITAMIN D3) 50 MCG
28-0.8 & 2 TABLET ORAL
Qty: 60 | Refills: 0 | Status: DISCONTINUED | COMMUNITY
Start: 2021-09-30 | End: 2024-01-29

## 2024-01-29 RX ORDER — OXYCODONE 5 MG/1
5 TABLET ORAL EVERY 6 HOURS
Qty: 6 | Refills: 0 | Status: DISCONTINUED | COMMUNITY
Start: 2023-05-08 | End: 2024-01-29

## 2024-01-29 RX ORDER — ACETAMINOPHEN 325 MG
TABLET ORAL
Refills: 0 | Status: ACTIVE | COMMUNITY

## 2024-01-29 RX ORDER — YOHIMBE BARK 500 MG
CAPSULE ORAL
Refills: 0 | Status: ACTIVE | COMMUNITY

## 2024-01-29 NOTE — PLAN
[FreeTextEntry1] : VISION SCREENING SAFETY / HEALTHY HABITS REVIEWED HEALTHY DIET AND LIFESTYLE MODIFICATIONS VACCINATIONS DISCUSSED: COVID, FLU, GARDASIL CHECK ROUTINE LAB WORK OVER FIVE MINUTES OF TIME SPENT IN DEPRESSION SCREENING, COUNSELING AND CARE COORDINATION.  DISCUSSED STRESS REDUCTION EXERCISES.  REFERRAL TO BEHAVIORAL HEALTH TEAM FOR THERAPY EVALUATION IF SUICIDAL/HOMICIDAL IDEATIONS OR PLANS CALL 911 OR GO TO ER IMMEDIATELY FOLLOW-UP ALL SPECIALISTS AS DIRECTED ROUTINE GYN EXAMS CALL WITH ANY QUESTIONS, CONCERNS OR CHANGES

## 2024-01-29 NOTE — HISTORY OF PRESENT ILLNESS
[FreeTextEntry1] : PHYSICAL [de-identified] : MS. ARAUZ IS A PLEASANT 29 YO PRESENTING FOR YEARLY PHYSICAL.  SHE IS S/P BILATERAL VULVAR EXCISION OF LIPOMAS MAY 2023 WITH GYN/ONC.  HISTORY OF DEPRESSION.  HAS NOT PREVIOUSLY SEEN A THERAPIST.  IS NOT INTERESTED IN MEDICATION AT THIS TIME.  UPCOMING FOLLOW-UP APPOINTMENT WITH GYN. LMP: 1/21/24

## 2024-01-29 NOTE — HEALTH RISK ASSESSMENT
[Good] : ~his/her~  mood as  good [Yes] : Yes [2 - 4 times a month (2 pts)] : 2-4 times a month (2 points) [1 or 2 (0 pts)] : 1 or 2 (0 points) [Never (0 pts)] : Never (0 points) [No] : In the past 12 months have you used drugs other than those required for medical reasons? No [Little interest or pleasure doing things] : 1) Little interest or pleasure doing things [Feeling down, depressed, or hopeless] : 2) Feeling down, depressed, or hopeless [1] : 2) Feeling down, depressed, or hopeless for several days (1) [PHQ-2 Positive] : PHQ-2 Positive [Nearly Every Day (3)] : 4.) Feeling tired or having little energy? Nearly every day [1/2 of Days or More (2)] : 5.) Poor appetite or overeating? Half the days or more [Several Days (1)] : 7.) Trouble concentrating on things, such as reading a newspaper or watching television? Several days [Not at All (0)] : 8.) Moving or speaking so slowly that other people could have noticed, or the opposite, moving or speaking faster than usual? Not at all [Mild] : severity of depression is mild [Very Difficult] : How difficult have these problems made it for you to do your work, take care of things at home, or get along with people? Very difficult [PHQ-9 Positive] : PHQ-9 Positive [I have developed a follow-up plan documented below in the note.] : I have developed a follow-up plan documented below in the note. [HIV Test offered] : HIV Test offered [Hepatitis C test offered] : Hepatitis C test offered [None] : None [With Family] : lives with family [# of Members in Household ___] :  household currently consist of [unfilled] member(s) [Unemployed] : unemployed [High School] : high school [# Of Children ___] : has [unfilled] children [Sexually Active] : sexually active [Feels Safe at Home] : Feels safe at home [Reports normal functional visual acuity (ie: able to read med bottle)] : Reports normal functional visual acuity [Smoke Detector] : smoke detector [Carbon Monoxide Detector] : carbon monoxide detector [Safety elements used in home] : safety elements used in home [Seat Belt] :  uses seat belt [Sunscreen] : uses sunscreen [Never] : Never [Audit-CScore] : 2 [de-identified] : NOT ROUTINELY EXERCISING [de-identified] : WORKING ON DIET [[id number]] : 9 [Change in mental status noted] : No change in mental status noted [Language] : denies difficulty with language [Learning/Retaining New Information] : denies difficulty learning/retaining new information [Handling Complex Tasks] : denies difficulty handling complex tasks [High Risk Behavior] : no high risk behavior [Reports changes in hearing] : Reports no changes in hearing [Reports changes in vision] : Reports no changes in vision [Reports changes in dental health] : Reports no changes in dental health [PapSmearDate] : 2023 [PapSmearComments] : DR. ANGEL; APPOINTMENT THURSDAY

## 2024-01-30 LAB
ALBUMIN SERPL ELPH-MCNC: 4.5 G/DL
ALP BLD-CCNC: 94 U/L
ALT SERPL-CCNC: 16 U/L
ANION GAP SERPL CALC-SCNC: 13 MMOL/L
APPEARANCE: CLEAR
AST SERPL-CCNC: 21 U/L
BASOPHILS # BLD AUTO: 0.05 K/UL
BASOPHILS NFR BLD AUTO: 1 %
BILIRUB SERPL-MCNC: 0.5 MG/DL
BILIRUBIN URINE: NEGATIVE
BLOOD URINE: NEGATIVE
BUN SERPL-MCNC: 9 MG/DL
CALCIUM SERPL-MCNC: 9.3 MG/DL
CHLORIDE SERPL-SCNC: 104 MMOL/L
CHOLEST SERPL-MCNC: 188 MG/DL
CO2 SERPL-SCNC: 23 MMOL/L
COLOR: YELLOW
CREAT SERPL-MCNC: 0.85 MG/DL
EGFR: 95 ML/MIN/1.73M2
EOSINOPHIL # BLD AUTO: 0.17 K/UL
EOSINOPHIL NFR BLD AUTO: 3.5 %
ESTIMATED AVERAGE GLUCOSE: 80 MG/DL
GLUCOSE QUALITATIVE U: NEGATIVE MG/DL
GLUCOSE SERPL-MCNC: 94 MG/DL
HBA1C MFR BLD HPLC: 4.4 %
HCT VFR BLD CALC: 39.2 %
HDLC SERPL-MCNC: 74 MG/DL
HGB BLD-MCNC: 12.6 G/DL
HIV1+2 AB SPEC QL IA.RAPID: NONREACTIVE
IMM GRANULOCYTES NFR BLD AUTO: 0 %
KETONES URINE: NEGATIVE MG/DL
LDLC SERPL CALC-MCNC: 98 MG/DL
LEUKOCYTE ESTERASE URINE: ABNORMAL
LYMPHOCYTES # BLD AUTO: 1.57 K/UL
LYMPHOCYTES NFR BLD AUTO: 32.6 %
MAN DIFF?: NORMAL
MCHC RBC-ENTMCNC: 29.7 PG
MCHC RBC-ENTMCNC: 32.1 GM/DL
MCV RBC AUTO: 92.5 FL
MONOCYTES # BLD AUTO: 0.3 K/UL
MONOCYTES NFR BLD AUTO: 6.2 %
NEUTROPHILS # BLD AUTO: 2.73 K/UL
NEUTROPHILS NFR BLD AUTO: 56.7 %
NITRITE URINE: NEGATIVE
NONHDLC SERPL-MCNC: 114 MG/DL
PH URINE: 5.5
PLATELET # BLD AUTO: 291 K/UL
POTASSIUM SERPL-SCNC: 4.3 MMOL/L
PROT SERPL-MCNC: 6.9 G/DL
PROTEIN URINE: NEGATIVE MG/DL
RBC # BLD: 4.24 M/UL
RBC # FLD: 12.4 %
SODIUM SERPL-SCNC: 140 MMOL/L
SPECIFIC GRAVITY URINE: 1.01
T4 FREE SERPL-MCNC: 1.1 NG/DL
TRIGL SERPL-MCNC: 91 MG/DL
TSH SERPL-ACNC: 1.29 UIU/ML
UROBILINOGEN URINE: 0.2 MG/DL
WBC # FLD AUTO: 4.82 K/UL

## 2024-02-08 LAB
C TRACH RRNA SPEC QL NAA+PROBE: NOT DETECTED
HAV IGM SER QL: NONREACTIVE
HBV CORE IGM SER QL: NONREACTIVE
HBV SURFACE AG SER QL: NONREACTIVE
HCV AB SER QL: NONREACTIVE
HCV S/CO RATIO: 0.19 S/CO
M TB IFN-G BLD-IMP: NEGATIVE
MEV IGG FLD QL IA: 22.6 AU/ML
MEV IGG+IGM SER-IMP: POSITIVE
MUV AB SER-ACNC: POSITIVE
MUV IGG SER QL IA: 50.1 AU/ML
N GONORRHOEA RRNA SPEC QL NAA+PROBE: NOT DETECTED
QUANTIFERON TB PLUS MITOGEN MINUS NIL: 7.94 IU/ML
QUANTIFERON TB PLUS NIL: 0.01 IU/ML
QUANTIFERON TB PLUS TB1 MINUS NIL: 0.01 IU/ML
QUANTIFERON TB PLUS TB2 MINUS NIL: 0.03 IU/ML
RUBV IGG FLD-ACNC: 10.2 INDEX
RUBV IGG SER-IMP: POSITIVE
SOURCE AMPLIFICATION: NORMAL
T PALLIDUM AB SER QL IA: NEGATIVE
VZV AB TITR SER: POSITIVE
VZV IGG SER IF-ACNC: 2537 INDEX

## 2024-02-09 LAB
HSV 1+2 IGG SER IA-IMP: NEGATIVE
HSV 1+2 IGG SER IA-IMP: POSITIVE
HSV1 IGG SER QL: 41.2 INDEX
HSV2 IGG SER QL: 0.22 INDEX

## 2024-03-08 NOTE — ED ADULT TRIAGE NOTE - TEMPERATURE IN FAHRENHEIT (DEGREES F)
Patient is in the lateral left side position.   The body was positioned using the following devices: wedge.
97.9

## 2024-03-26 LAB
AMPHET UR-MCNC: NEGATIVE NG/ML
BARBITURATES UR-MCNC: NEGATIVE NG/ML
BENZODIAZ UR-MCNC: NEGATIVE NG/ML
COCAINE METAB.OTHER UR-MCNC: NEGATIVE NG/ML
CREATININE, URINE: 149.8 MG/DL
FENTANYL, URINE: NEGATIVE NG/ML
METHADONE UR-MCNC: NEGATIVE NG/ML
OPIATES UR-MCNC: NEGATIVE NG/ML
OXYCODONE/OXYMORPHONE, URINE: NEGATIVE NG/ML
PCP UR-MCNC: NEGATIVE NG/ML
PH, URINE: 6.2
PLEASE NOTE: DRUGSCRUR: NORMAL
THC UR QL: NEGATIVE NG/ML

## 2024-05-01 NOTE — OB PROVIDER DELIVERY SUMMARY - AS DELIV COMPLICATIONS OB
-----------------------------------------------------------------  ENDOCRINE CLINIC NOTE  -----------------------------------------------------------------        PATIENT NAME: Chris Thomas   PATIENT : 1978 AGE: 45 y.o.  MRN NUMBER: [id number]  PRIMARY CARE: Shelia Cummings APRN    ==========================================================================    CHIEF COMPLAINT: Hypogonadism  DATE OF SERVICE: 24    ==========================================================================    HPI / SUBJECTIVE    45 y.o. male is seen in the clinic today for hypogonadism.  Patient was last seen my clinic in early 2024.  There was concerns that patient was suffering from erectile dysfunction and was referred by primary care because he was found to have low testosterone level.  Patient had blood work done properly in the morning which showed normal testosterone level.  Patient have a history significant of hypogonadism using AndroGel around 8 to 10 years ago for 6 months time but patient has been off therapy since.  Patient is trying to conceive and currently following high risk pregnancy, patient is not happy with the current care and planning to switch to different high risk pregnancy care.  Patient was able to conceive 3 times but ended up having miscarriages unfortunately and therefore seeking care.  Patient reported to have normal libido with erectile dysfunction but continues to have morning erection.  Patient have 2 biological kids and denied any trauma to the groin area have a history significant for obstructive sleep apnea currently on CPAP therapy.  No exposure to radiation or chemotherapy.  Patient have a BMI of 38.99  Patient is currently using sildenafil therapy and Cialis therapy was prescribed during the last visit but have not started yet.    ==========================================================================                                                PAST MEDICAL  HISTORY    Past Medical History:   Diagnosis Date    At risk for sleep apnea     STOP BANG 5    ED (erectile dysfunction)     Environmental allergies     tested in youth, no prior immunotherapy    Erectile dysfunction     Fracture, clavicle 1984    Broken collar bone as a child    Hip arthrosis 10/2022    This is the reason for my visit    Hypertension     MEDS IN THE PAST WITH WT LOSS.    Hypothyroidism     Low back pain 1/1/1998    Back injury.  Sporadic pain from excessive activity    Low back strain 1995    Work injury - [employer]    Low testosterone     Obesity 1/1/2000    SARAH (obstructive sleep apnea)     resolved after uvulectomy and T & A in 2005    Osgood-Schlatter's disease 1994    Never officially diagnosed. But was very symptomatic    Primary osteoarthritis of left knee     Testosterone deficiency     Vitamin D deficiency        ==========================================================================    PAST SURGICAL HISTORY    Past Surgical History:   Procedure Laterality Date    ADENOIDECTOMY  2004    TONSILLECTOMY AND ADENOIDECTOMY  2005    Dr. Martinez, done for sleep apnea    TOTAL HIP ARTHROPLASTY Right 03/11/2024    Procedure: TOTAL HIP ARTHROPLASTY ANTERIOR WITH HANA TABLE;  Surgeon: Kasi Dawson II, MD;  Location: [facility] OR [facility] [place];  Service: Orthopedics;  Laterality: Right;    UVULECTOMY  2005    for SARAH       ==========================================================================    FAMILY HISTORY    Family History   Problem Relation Age of Onset    Hypertension Mother     Hyperlipidemia Mother     Colon cancer Father         dx'd at 62    Hypertension Father     Cancer Father         Colon cancer    Hyperlipidemia Father     Hypertension Sister     No Known Problems Son     Alcohol abuse Maternal Uncle     Diabetes Maternal Grandmother     Arthritis Maternal Grandmother     Stroke Maternal Grandfather     COPD Paternal Grandmother     Stroke Paternal Grandfather     Heart disease  Paternal Grandfather     Lung disease Neg Hx     Malig Hyperthermia Neg Hx        ==========================================================================    SOCIAL HISTORY    Social History     Socioeconomic History    Marital status:    Tobacco Use    Smoking status: Never    Smokeless tobacco: Never    Tobacco comments:     Never smoked or used tobacco of any kind   Vaping Use    Vaping status: Never Used   Substance and Sexual Activity    Alcohol use: Not Currently     Comment: rare occassion    Drug use: No    Sexual activity: Yes     Partners: Female     Birth control/protection: None     Comment: Galina Castañeda       ==========================================================================    MEDICATIONS      Current Outpatient Medications:     cetirizine (zyrTEC) 10 MG tablet, Take 1 tablet by mouth Daily As Needed for Allergies or Rhinitis., Disp: , Rfl:     ibuprofen (ADVIL,MOTRIN) 800 MG tablet, TAKE 1 TABLET BY MOUTH EVERY 8 (EIGHT) HOURS AS NEEDED FOR MILD PAIN. (Patient taking differently: Take 1 tablet by mouth Every 8 (Eight) Hours As Needed for Mild Pain. WILL HOLD PER MD ORDERS), Disp: 180 tablet, Rfl: 1    levothyroxine (Synthroid) 50 MCG tablet, Take 1 tablet by mouth Daily., Disp: 90 tablet, Rfl: 3    tadalafil (Cialis) 5 MG tablet, Take 1 tablet by mouth Daily As Needed for Erectile Dysfunction., Disp: 30 tablet, Rfl: 5    ==========================================================================    ALLERGIES    Allergies   Allergen Reactions    Semaglutide(0.25 Or 0.5mg-Dos) Nausea And Vomiting    Penicillins Hives and Rash     CHILDHOOD. DOES FINE WITH CEPHALSPORINS       ==========================================================================    OBJECTIVE    Vitals:    05/01/24 0904   BP: 128/80   Pulse: 82   SpO2: 98%     Body mass index is 38.99 kg/m².     General: Alert, cooperative, no acute distress  Thyroid:  no enlargement/tenderness/palpable nodules  Lungs: Clear to  auscultation bilaterally, respirations unlabored  Heart: Regular rate and rhythm, S1 and S2 normal, no murmur, rub or gallop  Abdomen: Soft, NT, ND and Bowel sounds Positive  Extremities:  Extremities normal, atraumatic, no cyanosis or edema    ==========================================================================    LAB EVALUATION    Lab Results   Component Value Date    GLUCOSE 125 (H) 03/12/2024    BUN 10 03/12/2024    CREATININE 0.92 03/12/2024    EGFRIFNONA 80 01/14/2020    EGFRIFAFRI 97 01/14/2020    BCR 10.9 03/12/2024    K 3.7 03/12/2024    CO2 25.3 03/12/2024    CALCIUM 8.4 (L) 03/12/2024    PROTENTOTREF 6.9 12/14/2023    ALBUMIN 4.1 03/04/2024    LABIL2 1.8 12/14/2023    AST 14 03/04/2024    ALT 20 03/04/2024    TRIG 99 01/14/2020    HDL 36 (L) 01/14/2020    LDL 90 01/14/2020     Lab Results   Component Value Date    HGBA1C 5.50 03/04/2024    HGBA1C 5.8 (H) 12/14/2023    HGBA1C 5.50 01/14/2020     Lab Results   Component Value Date    MICROALBUR <3.0 12/19/2018    CREATININE 0.92 03/12/2024     Lab Results   Component Value Date    TSH 2.710 03/04/2024    FREET4 0.99 01/30/2020    THYROIDAB 6 01/30/2020     Component      Latest Ref Rng 4/5/2024   Testosterone, Total      264 - 916 ng/dL 305    Testosterone, Free      6.8 - 21.5 pg/mL 7.9    LH      mIU/mL 2.85    FSH      mIU/mL 4.98    Prolactin      4.04 - 15.20 ng/mL 9.79        ==========================================================================    ASSESSMENT AND PLAN    # Erectile dysfunction  - Patient had blood work done on 4/5/2024 which showed normal testosterone level and therefore patient is not a candidate for testosterone replacement therapy  - Regarding erectile dysfunction patient is currently using sildenafil therapy as needed  - Patient already have a prescription active for Cialis 5 mg 1 pill by mouth daily for erectile dysfunction already sent to the pharmacy  - Patient can continue to follow with primary care or can be seen  "in endocrine clinic for continued management for erectile dysfunction every 6 months  - Counseled patient about GoodRx for Cialis/sildenafil coverage    Return to clinic: As needed    Entire assessment and plan was discussed and counseled the patient in detail to which patient verbalized understanding and agreed with care.  Answered all queries and concerns.    This note was created using voice recognition software and is inherently subject to errors including those of syntax and \"sound-alike\" substitutions which may escape proofreading.  In such instances, original meaning may be extrapolated by contextual derivation.    Note: Portions of this note may have been copied from previous notes but documentation have been reviewed and edited as necessary to support clinical decision making for today's visit.    ==========================================================================    INFORMATION PROVIDED TO PATIENT    Patient Instructions   Please,    - Blood work on 4/5/2024 showed normal testosterone level.  - After completing your sildenafil therapy you can start using Cialis 5 mg 1 pill by mouth daily for erectile dysfunction.  - You can continue to follow-up with your primary care and can be seen in the endocrinology clinic as needed.    Thank you for your visit today.    If you have any questions or concerns please feel free to reach out of the office.       ==========================================================================  Terrence Pineda MD  Department of Endocrine, Diabetes and Metabolism  [facility], [place]  ==========================================================================  " nuchal cord/meconium stained fluid

## 2024-05-08 NOTE — OB RN PATIENT PROFILE - NS_ACCOMPAINEDBY_OBGYN_ALL_OB
Patient presents with c/o constipation for 7 days Left sided lower abdominal pain and lower back pain.
Significant Other

## 2024-05-21 ENCOUNTER — APPOINTMENT (OUTPATIENT)
Dept: FAMILY MEDICINE | Facility: CLINIC | Age: 29
End: 2024-05-21

## 2024-07-31 NOTE — ED PROVIDER NOTE - MDM ORDERS SUBMITTED SELECTION
Imaging Studies/Medications

## 2024-08-08 ENCOUNTER — APPOINTMENT (OUTPATIENT)
Dept: DERMATOLOGY | Facility: CLINIC | Age: 29
End: 2024-08-08

## 2024-08-08 ENCOUNTER — APPOINTMENT (OUTPATIENT)
Dept: FAMILY MEDICINE | Facility: CLINIC | Age: 29
End: 2024-08-08

## 2024-08-08 PROBLEM — H66.92 CHRONIC INFECTION OF LEFT EAR: Status: ACTIVE | Noted: 2024-08-08

## 2024-08-08 PROCEDURE — 99213 OFFICE O/P EST LOW 20 MIN: CPT

## 2024-08-08 NOTE — PLAN
[FreeTextEntry1] : NO SIGN OF INFECTION TODAY WILL REFER TO ENT FOR FURTHER EVALUATION DISCUSS POSSIBLE ALTERNATE ETIOLOGY OF PRIOR EAR DISCOMFORT INCLUDING DENTAL, TMJ, ETC. CALL WITH ANY QUESTIONS, CONCERNS OR CHANGES  ADDITIONAL TIME SPENT IN CHART REVIEW, NOTE COMPLETION

## 2024-08-08 NOTE — PHYSICAL EXAM
[No Acute Distress] : no acute distress [Well Nourished] : well nourished [Well-Appearing] : well-appearing [Normal Sclera/Conjunctiva] : normal sclera/conjunctiva [PERRL] : pupils equal round and reactive to light [Normal Outer Ear/Nose] : the outer ears and nose were normal in appearance [Normal Oropharynx] : the oropharynx was normal [Normal TMs] : both tympanic membranes were normal [No Respiratory Distress] : no respiratory distress  [No Accessory Muscle Use] : no accessory muscle use [Clear to Auscultation] : lungs were clear to auscultation bilaterally [Normal Rate] : normal rate  [Regular Rhythm] : with a regular rhythm [Normal S1, S2] : normal S1 and S2
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
## 2024-08-12 ENCOUNTER — APPOINTMENT (OUTPATIENT)
Dept: DERMATOLOGY | Facility: CLINIC | Age: 29
End: 2024-08-12
Payer: MEDICAID

## 2024-08-12 PROCEDURE — 99214 OFFICE O/P EST MOD 30 MIN: CPT

## 2024-09-30 ENCOUNTER — NON-APPOINTMENT (OUTPATIENT)
Age: 29
End: 2024-09-30

## 2024-09-30 DIAGNOSIS — R60.0 LOCALIZED EDEMA: ICD-10-CM

## 2024-09-30 DIAGNOSIS — M79.672 PAIN IN LEFT FOOT: ICD-10-CM

## 2024-09-30 DIAGNOSIS — S92.515D NONDISPLACED FRACTURE OF PROXIMAL PHALANX OF LEFT LESSER TOE(S), SUBSEQUENT ENCOUNTER FOR FRACTURE WITH ROUTINE HEALING: ICD-10-CM

## 2024-10-04 ENCOUNTER — APPOINTMENT (OUTPATIENT)
Dept: FAMILY MEDICINE | Facility: CLINIC | Age: 29
End: 2024-10-04

## 2024-10-04 VITALS
HEART RATE: 113 BPM | OXYGEN SATURATION: 98 % | SYSTOLIC BLOOD PRESSURE: 118 MMHG | DIASTOLIC BLOOD PRESSURE: 76 MMHG | HEIGHT: 66 IN | WEIGHT: 185 LBS | BODY MASS INDEX: 29.73 KG/M2 | TEMPERATURE: 98.6 F

## 2024-10-04 VITALS — HEART RATE: 92 BPM

## 2024-10-04 DIAGNOSIS — M54.9 DORSALGIA, UNSPECIFIED: ICD-10-CM

## 2024-10-04 LAB
HCG UR QL: NEGATIVE
QUALITY CONTROL: YES

## 2024-10-04 PROCEDURE — 99213 OFFICE O/P EST LOW 20 MIN: CPT

## 2024-10-04 PROCEDURE — 81025 URINE PREGNANCY TEST: CPT
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
## 2024-10-13 PROBLEM — M54.9 MID BACK PAIN: Status: ACTIVE | Noted: 2024-10-04

## 2024-10-16 ENCOUNTER — APPOINTMENT (OUTPATIENT)
Dept: DERMATOLOGY | Facility: CLINIC | Age: 29
End: 2024-10-16
Payer: MEDICAID

## 2024-10-16 PROCEDURE — 99214 OFFICE O/P EST MOD 30 MIN: CPT

## 2024-10-23 ENCOUNTER — APPOINTMENT (OUTPATIENT)
Age: 29
End: 2024-10-23
Payer: MEDICAID

## 2024-10-23 VITALS — BODY MASS INDEX: 29.73 KG/M2 | WEIGHT: 185 LBS | HEIGHT: 66 IN

## 2024-10-23 DIAGNOSIS — M79.672 PAIN IN LEFT FOOT: ICD-10-CM

## 2024-10-23 DIAGNOSIS — S92.515D NONDISPLACED FRACTURE OF PROXIMAL PHALANX OF LEFT LESSER TOE(S), SUBSEQUENT ENCOUNTER FOR FRACTURE WITH ROUTINE HEALING: ICD-10-CM

## 2024-10-23 DIAGNOSIS — R60.0 LOCALIZED EDEMA: ICD-10-CM

## 2024-10-23 PROCEDURE — 73630 X-RAY EXAM OF FOOT: CPT | Mod: LT

## 2024-10-23 PROCEDURE — 99204 OFFICE O/P NEW MOD 45 MIN: CPT

## 2024-10-24 NOTE — OB RN PATIENT PROFILE - THE IMPORTANCE OF THE CORRECT PLACEMENT OF THE INFANT AND THE PARENT’S ABILITY TO ASSESS THE INFANT'S SKIN COLOR WHILE PLACED ON SKIN TO SKIN HAS BEEN REINFORCED.
REFILL SENT TO DR SAMPSON FOR TADALAFIL 20 MG 1 TABLET DAILY PRN TO REVIEW AND SEND TO PHARMACY.    [facility] [phone]  
Statement Selected

## 2024-11-19 ENCOUNTER — APPOINTMENT (OUTPATIENT)
Dept: OBGYN | Facility: CLINIC | Age: 29
End: 2024-11-19
Payer: MEDICAID

## 2024-11-19 VITALS
BODY MASS INDEX: 29.57 KG/M2 | WEIGHT: 184 LBS | HEIGHT: 66 IN | SYSTOLIC BLOOD PRESSURE: 130 MMHG | DIASTOLIC BLOOD PRESSURE: 74 MMHG

## 2024-11-19 DIAGNOSIS — R93.89 ABNORMAL FINDINGS ON DIAGNOSTIC IMAGING OF OTHER SPECIFIED BODY STRUCTURES: ICD-10-CM

## 2024-11-19 PROCEDURE — 99214 OFFICE O/P EST MOD 30 MIN: CPT

## 2024-12-05 ENCOUNTER — APPOINTMENT (OUTPATIENT)
Dept: ORTHOPEDIC SURGERY | Facility: CLINIC | Age: 29
End: 2024-12-05
Payer: MEDICAID

## 2024-12-05 VITALS
SYSTOLIC BLOOD PRESSURE: 120 MMHG | WEIGHT: 183 LBS | BODY MASS INDEX: 29.41 KG/M2 | HEART RATE: 74 BPM | DIASTOLIC BLOOD PRESSURE: 80 MMHG | HEIGHT: 66 IN

## 2024-12-05 DIAGNOSIS — S29.019A STRAIN OF MUSCLE AND TENDON OF UNSPECIFIED WALL OF THORAX, INITIAL ENCOUNTER: ICD-10-CM

## 2024-12-05 DIAGNOSIS — M41.124 ADOLESCENT IDIOPATHIC SCOLIOSIS, THORACIC REGION: ICD-10-CM

## 2024-12-05 PROCEDURE — 99204 OFFICE O/P NEW MOD 45 MIN: CPT

## 2024-12-05 PROCEDURE — 72110 X-RAY EXAM L-2 SPINE 4/>VWS: CPT

## 2024-12-09 ENCOUNTER — APPOINTMENT (OUTPATIENT)
Dept: ORTHOPEDIC SURGERY | Facility: CLINIC | Age: 29
End: 2024-12-09
Payer: MEDICAID

## 2024-12-09 VITALS
HEIGHT: 66 IN | SYSTOLIC BLOOD PRESSURE: 127 MMHG | BODY MASS INDEX: 29.41 KG/M2 | HEART RATE: 83 BPM | WEIGHT: 183 LBS | DIASTOLIC BLOOD PRESSURE: 86 MMHG

## 2024-12-09 DIAGNOSIS — M25.561 PAIN IN RIGHT KNEE: ICD-10-CM

## 2024-12-09 PROCEDURE — 73610 X-RAY EXAM OF ANKLE: CPT | Mod: RT

## 2024-12-09 PROCEDURE — 99203 OFFICE O/P NEW LOW 30 MIN: CPT

## 2024-12-09 RX ORDER — MELOXICAM 15 MG/1
15 TABLET ORAL
Qty: 21 | Refills: 0 | Status: ACTIVE | COMMUNITY
Start: 2024-12-09 | End: 1900-01-01

## 2024-12-12 ENCOUNTER — APPOINTMENT (OUTPATIENT)
Dept: OBGYN | Facility: CLINIC | Age: 29
End: 2024-12-12
Payer: MEDICAID

## 2024-12-12 ENCOUNTER — APPOINTMENT (OUTPATIENT)
Dept: ANTEPARTUM | Facility: CLINIC | Age: 29
End: 2024-12-12
Payer: MEDICAID

## 2024-12-12 ENCOUNTER — ASOB RESULT (OUTPATIENT)
Age: 29
End: 2024-12-12

## 2024-12-12 VITALS
WEIGHT: 191 LBS | DIASTOLIC BLOOD PRESSURE: 77 MMHG | BODY MASS INDEX: 30.7 KG/M2 | SYSTOLIC BLOOD PRESSURE: 117 MMHG | HEIGHT: 66 IN

## 2024-12-12 DIAGNOSIS — R93.89 ABNORMAL FINDINGS ON DIAGNOSTIC IMAGING OF OTHER SPECIFIED BODY STRUCTURES: ICD-10-CM

## 2024-12-12 PROCEDURE — 99213 OFFICE O/P EST LOW 20 MIN: CPT

## 2024-12-12 PROCEDURE — 76856 US EXAM PELVIC COMPLETE: CPT | Mod: 59

## 2024-12-12 PROCEDURE — 76830 TRANSVAGINAL US NON-OB: CPT

## 2025-01-05 NOTE — OB PROVIDER H&P - NS_FETALPRESENTATIONA_OBGYN_ALL_OB
Problem: Respiratory - Adult  Goal: Achieves optimal ventilation and oxygenation  Outcome: Progressing      Cephalic

## 2025-01-07 ENCOUNTER — APPOINTMENT (OUTPATIENT)
Dept: FAMILY MEDICINE | Facility: CLINIC | Age: 30
End: 2025-01-07
Payer: MEDICAID

## 2025-01-07 ENCOUNTER — NON-APPOINTMENT (OUTPATIENT)
Age: 30
End: 2025-01-07

## 2025-01-07 VITALS
SYSTOLIC BLOOD PRESSURE: 106 MMHG | OXYGEN SATURATION: 96 % | HEIGHT: 66 IN | DIASTOLIC BLOOD PRESSURE: 70 MMHG | BODY MASS INDEX: 31.18 KG/M2 | HEART RATE: 87 BPM | WEIGHT: 194 LBS

## 2025-01-07 DIAGNOSIS — M26.609 UNSPECIFIED TEMPOROMANDIBULAR JOINT DISORDER: ICD-10-CM

## 2025-01-07 DIAGNOSIS — R53.83 OTHER FATIGUE: ICD-10-CM

## 2025-01-07 DIAGNOSIS — L65.9 NONSCARRING HAIR LOSS, UNSPECIFIED: ICD-10-CM

## 2025-01-07 PROCEDURE — 99213 OFFICE O/P EST LOW 20 MIN: CPT

## 2025-01-08 LAB
25(OH)D3 SERPL-MCNC: 20.2 NG/ML
BASOPHILS # BLD AUTO: 0.02 K/UL
BASOPHILS NFR BLD AUTO: 0.3 %
EOSINOPHIL # BLD AUTO: 0.23 K/UL
EOSINOPHIL NFR BLD AUTO: 3.7 %
FERRITIN SERPL-MCNC: 60 NG/ML
FSH SERPL-MCNC: 2.9 IU/L
HCT VFR BLD CALC: 38.9 %
HGB BLD-MCNC: 12.4 G/DL
IMM GRANULOCYTES NFR BLD AUTO: 0.3 %
IRON SATN MFR SERPL: 12 %
IRON SERPL-MCNC: 38 UG/DL
LH SERPL-ACNC: 6.9 IU/L
LYMPHOCYTES # BLD AUTO: 2.02 K/UL
LYMPHOCYTES NFR BLD AUTO: 32.7 %
MAN DIFF?: NORMAL
MCHC RBC-ENTMCNC: 30.1 PG
MCHC RBC-ENTMCNC: 31.9 G/DL
MCV RBC AUTO: 94.4 FL
MONOCYTES # BLD AUTO: 0.39 K/UL
MONOCYTES NFR BLD AUTO: 6.3 %
NEUTROPHILS # BLD AUTO: 3.49 K/UL
NEUTROPHILS NFR BLD AUTO: 56.7 %
PLATELET # BLD AUTO: 296 K/UL
PROLACTIN SERPL-MCNC: 15.3 NG/ML
RBC # BLD: 4.12 M/UL
RBC # FLD: 12.6 %
T PALLIDUM AB SER QL IA: NEGATIVE
T4 FREE SERPL-MCNC: 1.2 NG/DL
TESTOST SERPL-MCNC: 15.8 NG/DL
TIBC SERPL-MCNC: 318 UG/DL
TSH SERPL-ACNC: 1.13 UIU/ML
UIBC SERPL-MCNC: 280 UG/DL
VIT B12 SERPL-MCNC: 432 PG/ML
WBC # FLD AUTO: 6.17 K/UL

## 2025-01-19 LAB — DHEA-SULFATE, SERUM: 58 UG/DL

## 2025-02-11 ENCOUNTER — APPOINTMENT (OUTPATIENT)
Dept: OBGYN | Facility: CLINIC | Age: 30
End: 2025-02-11
Payer: MEDICAID

## 2025-02-11 VITALS
HEIGHT: 66 IN | BODY MASS INDEX: 31.34 KG/M2 | SYSTOLIC BLOOD PRESSURE: 115 MMHG | WEIGHT: 195 LBS | DIASTOLIC BLOOD PRESSURE: 76 MMHG

## 2025-02-11 DIAGNOSIS — Z01.419 ENCOUNTER FOR GYNECOLOGICAL EXAMINATION (GENERAL) (ROUTINE) W/OUT ABNORMAL FINDINGS: ICD-10-CM

## 2025-02-11 DIAGNOSIS — Z30.40 ENCOUNTER FOR SURVEILLANCE OF CONTRACEPTIVES, UNSPECIFIED: ICD-10-CM

## 2025-02-11 PROCEDURE — 99395 PREV VISIT EST AGE 18-39: CPT

## 2025-02-11 RX ORDER — DROSPIRENONE AND ESTETROL 3-14.2(28)
3-14.2 KIT ORAL
Qty: 3 | Refills: 3 | Status: ACTIVE | COMMUNITY
Start: 2025-02-11 | End: 1900-01-01

## 2025-02-17 LAB
CYTOLOGY CVX/VAG DOC THIN PREP: NORMAL
HPV HIGH+LOW RISK DNA PNL CVX: DETECTED

## 2025-03-04 ENCOUNTER — APPOINTMENT (OUTPATIENT)
Dept: FAMILY MEDICINE | Facility: CLINIC | Age: 30
End: 2025-03-04
Payer: MEDICAID

## 2025-03-04 VITALS
HEART RATE: 85 BPM | HEIGHT: 66 IN | OXYGEN SATURATION: 94 % | SYSTOLIC BLOOD PRESSURE: 102 MMHG | BODY MASS INDEX: 31.98 KG/M2 | WEIGHT: 199 LBS | DIASTOLIC BLOOD PRESSURE: 66 MMHG

## 2025-03-04 DIAGNOSIS — E55.9 VITAMIN D DEFICIENCY, UNSPECIFIED: ICD-10-CM

## 2025-03-04 DIAGNOSIS — Z11.1 ENCOUNTER FOR SCREENING FOR RESPIRATORY TUBERCULOSIS: ICD-10-CM

## 2025-03-04 PROCEDURE — 99213 OFFICE O/P EST LOW 20 MIN: CPT

## 2025-03-05 LAB
APPEARANCE: CLEAR
BACTERIA: NEGATIVE /HPF
BILIRUBIN URINE: NEGATIVE
BLOOD URINE: NEGATIVE
CAST: 0 /LPF
COLOR: YELLOW
EPITHELIAL CELLS: 1 /HPF
ESTIMATED AVERAGE GLUCOSE: 80 MG/DL
GLUCOSE QUALITATIVE U: NEGATIVE MG/DL
HBA1C MFR BLD HPLC: 4.4 %
KETONES URINE: NEGATIVE MG/DL
LEUKOCYTE ESTERASE URINE: NEGATIVE
MICROSCOPIC-UA: NORMAL
NITRITE URINE: NEGATIVE
PH URINE: 6.5
PROTEIN URINE: NEGATIVE MG/DL
RED BLOOD CELLS URINE: 2 /HPF
SPECIFIC GRAVITY URINE: 1.01
UROBILINOGEN URINE: 0.2 MG/DL
WHITE BLOOD CELLS URINE: 0 /HPF

## 2025-03-06 ENCOUNTER — APPOINTMENT (OUTPATIENT)
Dept: FAMILY MEDICINE | Facility: CLINIC | Age: 30
End: 2025-03-06
Payer: MEDICAID

## 2025-03-06 VITALS
HEART RATE: 78 BPM | WEIGHT: 198 LBS | BODY MASS INDEX: 31.96 KG/M2 | DIASTOLIC BLOOD PRESSURE: 68 MMHG | SYSTOLIC BLOOD PRESSURE: 106 MMHG | OXYGEN SATURATION: 100 %

## 2025-03-06 DIAGNOSIS — Z83.42 FAMILY HISTORY OF FAMILIAL HYPERCHOLESTEROLEMIA: ICD-10-CM

## 2025-03-06 DIAGNOSIS — Z00.00 ENCOUNTER FOR GENERAL ADULT MEDICAL EXAMINATION W/OUT ABNORMAL FINDINGS: ICD-10-CM

## 2025-03-06 DIAGNOSIS — Z82.3 FAMILY HISTORY OF STROKE: ICD-10-CM

## 2025-03-06 LAB
ALBUMIN SERPL ELPH-MCNC: 4.2 G/DL
ALP BLD-CCNC: 100 U/L
ALT SERPL-CCNC: 20 U/L
ANION GAP SERPL CALC-SCNC: 12 MMOL/L
AST SERPL-CCNC: 22 U/L
BILIRUB SERPL-MCNC: 0.4 MG/DL
BUN SERPL-MCNC: 11 MG/DL
CALCIUM SERPL-MCNC: 9.1 MG/DL
CHLORIDE SERPL-SCNC: 104 MMOL/L
CHOLEST SERPL-MCNC: 164 MG/DL
CO2 SERPL-SCNC: 26 MMOL/L
CREAT SERPL-MCNC: 0.86 MG/DL
EGFRCR SERPLBLD CKD-EPI 2021: 93 ML/MIN/1.73M2
GLUCOSE SERPL-MCNC: 100 MG/DL
HDLC SERPL-MCNC: 63 MG/DL
LDLC SERPL CALC-MCNC: 78 MG/DL
MEV IGG FLD QL IA: 21.8 AU/ML
MEV IGG+IGM SER-IMP: POSITIVE
MUV AB SER-ACNC: POSITIVE
MUV IGG SER QL IA: 23.6 AU/ML
NONHDLC SERPL-MCNC: 101 MG/DL
POTASSIUM SERPL-SCNC: 4.1 MMOL/L
PROT SERPL-MCNC: 6.8 G/DL
RUBV IGG FLD-ACNC: 5.33 INDEX
RUBV IGG SER-IMP: POSITIVE
SODIUM SERPL-SCNC: 141 MMOL/L
TRIGL SERPL-MCNC: 131 MG/DL
VZV AB TITR SER: POSITIVE
VZV IGG SER IF-ACNC: 11 S/CO

## 2025-03-06 PROCEDURE — 99395 PREV VISIT EST AGE 18-39: CPT

## 2025-03-07 PROBLEM — Z11.1 SCREENING EXAMINATION FOR PULMONARY TUBERCULOSIS: Status: ACTIVE | Noted: 2025-03-04

## 2025-03-07 PROBLEM — E55.9 VITAMIN D INSUFFICIENCY: Status: ACTIVE | Noted: 2025-03-04

## 2025-03-10 ENCOUNTER — NON-APPOINTMENT (OUTPATIENT)
Age: 30
End: 2025-03-10

## 2025-03-10 LAB
M TB IFN-G BLD-IMP: NEGATIVE
QUANTIFERON TB PLUS MITOGEN MINUS NIL: 3.37 IU/ML
QUANTIFERON TB PLUS NIL: 0.03 IU/ML
QUANTIFERON TB PLUS TB1 MINUS NIL: 0 IU/ML
QUANTIFERON TB PLUS TB2 MINUS NIL: 0 IU/ML

## 2025-03-16 LAB — HBV SURFACE AB SERPL IA-ACNC: 30.4 MIU/ML

## 2025-04-15 ENCOUNTER — OFFICE (OUTPATIENT)
Dept: URBAN - METROPOLITAN AREA CLINIC 112 | Facility: CLINIC | Age: 30
Setting detail: OPHTHALMOLOGY
End: 2025-04-15
Payer: MEDICAID

## 2025-04-15 DIAGNOSIS — H16.223: ICD-10-CM

## 2025-04-15 DIAGNOSIS — H43.393: ICD-10-CM

## 2025-04-15 DIAGNOSIS — H52.03: ICD-10-CM

## 2025-04-15 PROBLEM — H16.222 DRY EYE SYNDROME K SICCA; RIGHT EYE, LEFT EYE, BOTH EYES: Status: ACTIVE | Noted: 2025-04-15

## 2025-04-15 PROBLEM — H43.391 VITREOUS FLOATERS; RIGHT EYE, LEFT EYE, BOTH EYES: Status: ACTIVE | Noted: 2025-04-15

## 2025-04-15 PROBLEM — H43.392 VITREOUS FLOATERS; RIGHT EYE, LEFT EYE, BOTH EYES: Status: ACTIVE | Noted: 2025-04-15

## 2025-04-15 PROBLEM — H16.221 DRY EYE SYNDROME K SICCA; RIGHT EYE, LEFT EYE, BOTH EYES: Status: ACTIVE | Noted: 2025-04-15

## 2025-04-15 PROCEDURE — 92250 FUNDUS PHOTOGRAPHY W/I&R: CPT | Performed by: OPHTHALMOLOGY

## 2025-04-15 PROCEDURE — 92004 COMPRE OPH EXAM NEW PT 1/>: CPT | Performed by: OPHTHALMOLOGY

## 2025-04-15 PROCEDURE — 92015 DETERMINE REFRACTIVE STATE: CPT | Performed by: OPHTHALMOLOGY

## 2025-04-15 ASSESSMENT — REFRACTION_AUTOREFRACTION
OD_CYLINDER: -0.25
OS_CYLINDER: -0.25
OD_AXIS: 112
OS_AXIS: 114
OS_SPHERE: +1.50
OD_SPHERE: +0.25

## 2025-04-15 ASSESSMENT — REFRACTION_MANIFEST
OS_VA1: 20/20
OS_AXIS: 114
OD_SPHERE: PLANO
OS_SPHERE: +1.50
OU_VA: 20/20
OD_AXIS: 112
OS_CYLINDER: 0.00
OD_CYLINDER: -0.25
OD_VA1: 20/20

## 2025-04-15 ASSESSMENT — VISUAL ACUITY
OS_BCVA: 20/20
OD_BCVA: 20/25

## 2025-04-15 ASSESSMENT — REFRACTION_CURRENTRX
OS_VPRISM_DIRECTION: SV
OD_SPHERE: +0.50
OS_CYLINDER: -0.25
OD_VPRISM_DIRECTION: SV
OD_AXIS: 048
OS_OVR_VA: 20/
OS_SPHERE: +1.50
OD_OVR_VA: 20/
OS_AXIS: 150
OD_CYLINDER: -0.50

## 2025-04-15 ASSESSMENT — CONFRONTATIONAL VISUAL FIELD TEST (CVF)
OS_FINDINGS: FULL
OD_FINDINGS: FULL

## 2025-04-15 ASSESSMENT — TONOMETRY
OD_IOP_MMHG: 20
OS_IOP_MMHG: 18

## 2025-04-15 ASSESSMENT — KERATOMETRY
OS_K1POWER_DIOPTERS: 42.50
OD_AXISANGLE_DEGREES: 057
OD_K1POWER_DIOPTERS: 42.25
OS_AXISANGLE_DEGREES: 095
OD_K2POWER_DIOPTERS: 42.75
OS_K2POWER_DIOPTERS: 42.75

## 2025-04-15 ASSESSMENT — SUPERFICIAL PUNCTATE KERATITIS (SPK)
OS_SPK: 1+ 2+
OD_SPK: 1+ 2+

## 2025-04-16 ENCOUNTER — APPOINTMENT (OUTPATIENT)
Dept: ORTHOPEDIC SURGERY | Facility: CLINIC | Age: 30
End: 2025-04-16
Payer: MEDICAID

## 2025-04-16 VITALS
WEIGHT: 198 LBS | DIASTOLIC BLOOD PRESSURE: 79 MMHG | BODY MASS INDEX: 31.82 KG/M2 | HEIGHT: 66 IN | HEART RATE: 76 BPM | SYSTOLIC BLOOD PRESSURE: 121 MMHG

## 2025-04-16 DIAGNOSIS — M77.8 OTHER ENTHESOPATHIES, NOT ELSEWHERE CLASSIFIED: ICD-10-CM

## 2025-04-16 PROCEDURE — 99203 OFFICE O/P NEW LOW 30 MIN: CPT

## 2025-04-16 RX ORDER — DICLOFENAC SODIUM 20 MG/G
2 SOLUTION TOPICAL
Qty: 1 | Refills: 1 | Status: ACTIVE | COMMUNITY
Start: 2025-04-16 | End: 1900-01-01

## 2025-04-24 ENCOUNTER — APPOINTMENT (OUTPATIENT)
Dept: ORTHOPEDIC SURGERY | Facility: CLINIC | Age: 30
End: 2025-04-24
Payer: MEDICAID

## 2025-04-24 DIAGNOSIS — M65.4 RADIAL STYLOID TENOSYNOVITIS [DE QUERVAIN]: ICD-10-CM

## 2025-04-24 PROCEDURE — G2211 COMPLEX E/M VISIT ADD ON: CPT | Mod: NC

## 2025-04-24 PROCEDURE — 99213 OFFICE O/P EST LOW 20 MIN: CPT

## 2025-04-24 RX ORDER — MELOXICAM 15 MG/1
15 TABLET ORAL
Qty: 30 | Refills: 0 | Status: ACTIVE | COMMUNITY
Start: 2025-04-24 | End: 1900-01-01

## 2025-05-01 ENCOUNTER — APPOINTMENT (OUTPATIENT)
Dept: DERMATOLOGY | Facility: CLINIC | Age: 30
End: 2025-05-01
Payer: MEDICAID

## 2025-05-01 PROCEDURE — 99213 OFFICE O/P EST LOW 20 MIN: CPT

## 2025-05-15 ENCOUNTER — APPOINTMENT (OUTPATIENT)
Dept: VASCULAR SURGERY | Facility: CLINIC | Age: 30
End: 2025-05-15

## 2025-05-20 ENCOUNTER — APPOINTMENT (OUTPATIENT)
Dept: OTOLARYNGOLOGY | Facility: CLINIC | Age: 30
End: 2025-05-20
Payer: MEDICAID

## 2025-05-20 VITALS — WEIGHT: 190 LBS | BODY MASS INDEX: 30.53 KG/M2 | HEIGHT: 66 IN

## 2025-05-20 DIAGNOSIS — H65.22 CHRONIC SEROUS OTITIS MEDIA, LEFT EAR: ICD-10-CM

## 2025-05-20 DIAGNOSIS — Z91.09 OTHER ALLERGY STATUS, OTHER THAN TO DRUGS AND BIOLOGICAL SUBSTANCES: ICD-10-CM

## 2025-05-20 DIAGNOSIS — H90.12 CONDUCTIVE HEARING LOSS, UNILATERAL, LEFT EAR, WITH UNRESTRICTED HEARING ON THE CONTRALATERAL SIDE: ICD-10-CM

## 2025-05-20 PROCEDURE — 31231 NASAL ENDOSCOPY DX: CPT

## 2025-05-20 PROCEDURE — 92567 TYMPANOMETRY: CPT

## 2025-05-20 PROCEDURE — 99204 OFFICE O/P NEW MOD 45 MIN: CPT | Mod: 25

## 2025-05-20 PROCEDURE — 92557 COMPREHENSIVE HEARING TEST: CPT

## 2025-05-20 RX ORDER — PREDNISONE 10 MG/1
10 TABLET ORAL
Qty: 18 | Refills: 2 | Status: ACTIVE | COMMUNITY
Start: 2025-05-20 | End: 1900-01-01

## 2025-05-20 RX ORDER — AZELASTINE HYDROCHLORIDE 137 UG/1
0.1 SPRAY, METERED NASAL TWICE DAILY
Qty: 1 | Refills: 5 | Status: ACTIVE | COMMUNITY
Start: 2025-05-20 | End: 1900-01-01

## 2025-05-27 ENCOUNTER — RX RENEWAL (OUTPATIENT)
Age: 30
End: 2025-05-27

## 2025-06-08 NOTE — DISCHARGE NOTE OB - BREAST MILK IS MORE DIGESTIBLE, MAKING VOMITING, DIARRHEA, GAS AND CONSTIPATION LESS COMMON
[Follow-Up Visit] : a follow-up [Spouse] : spouse [FreeTextEntry2] : relapsed ovarian cancer. Statement Selected

## 2025-06-11 ENCOUNTER — APPOINTMENT (OUTPATIENT)
Dept: OTOLARYNGOLOGY | Facility: CLINIC | Age: 30
End: 2025-06-11
Payer: MEDICAID

## 2025-06-11 VITALS — WEIGHT: 190 LBS | BODY MASS INDEX: 30.53 KG/M2 | HEIGHT: 66 IN

## 2025-06-11 PROBLEM — J30.9 ALLERGIC RHINITIS, UNSPECIFIED SEASONALITY, UNSPECIFIED TRIGGER: Status: ACTIVE | Noted: 2025-06-11

## 2025-06-11 PROBLEM — H68.022 CHRONIC EUSTACHIAN SALPINGITIS OF LEFT EAR: Status: ACTIVE | Noted: 2025-06-11

## 2025-06-11 PROCEDURE — 92504 EAR MICROSCOPY EXAMINATION: CPT

## 2025-06-11 PROCEDURE — 99213 OFFICE O/P EST LOW 20 MIN: CPT | Mod: 25

## 2025-06-11 RX ORDER — PREDNISONE 10 MG/1
10 TABLET ORAL TWICE DAILY
Qty: 15 | Refills: 2 | Status: ACTIVE | COMMUNITY
Start: 2025-06-11 | End: 1900-01-01

## 2025-07-25 NOTE — HEALTH RISK ASSESSMENT
GENERAL PRE-PROCEDURE:   Procedure:  Coronary angiogram with possible PCI, left heart catheterization  Date/Time:  7/25/2025 7:17 AM    Written consent obtained?: Yes    Risks and benefits: Risks, benefits and alternatives were discussed    Consent given by:  Patient  Patient states understanding of procedure being performed: Yes    Patient's understanding of procedure matches consent: Yes    Procedure consent matches procedure scheduled: Yes    Expected level of sedation:  Moderate  Appropriately NPO:  Yes  ASA Class:  3 (chest pain, palpitations, hypercholesterolemia, strong family history of CAD)  Mallampati  :  Grade 2- soft palate, base of uvula, tonsillar pillars, and portion of posterior pharyngeal wall visible  Lungs:  Lungs clear with good breath sounds bilaterally  Heart:  Normal heart sounds and rate  History & Physical reviewed:  History and physical reviewed and updates made (see comment)  H&P Comments:  Clinically Significant Risk Factors Present on Admission    Cardiovascular : chest pain, palpitations, hypercholesterolemia, strong family history of CAD    Fluid & Electrolyte Disorders : Not present on admission    Gastroenterology : Not present on admission    Hematology/Oncology : Not present on admission    Nephrology : Not present on admission    Neurology : Not present on admission    Pulmonology : Not present on admission    Systemic : Not present on admission    Statement of review:  I have reviewed the lab findings, diagnostic data, medications, and the plan for sedation     [1] : 1) Little interest or pleasure doing things for several days (1) [2] : 2) Feeling down, depressed, or hopeless for more than half of the days (2) [PHQ-2 Positive] : PHQ-2 Positive [Not at All (0)] : 3.) Trouble falling asleep, or sleeping too much? Not at all [Several Days (1)] : 7.) Trouble concentrating on things, such as reading a newspaper or watching television? Several days [1/2 of Days or More (2)] : 8.) Moving or speaking so slowly that other people could have noticed, or the opposite, moving or speaking faster than usual? Half the days or more [Somewhat Difficult] : How difficult have these problems made it for you to do your work, take care of things at home, or get along with people? Somewhat difficult [PHQ-9 Positive] : PHQ-9 Positive [I have developed a follow-up plan documented below in the note.] : I have developed a follow-up plan documented below in the note. [[id number]] : 3 [[id number]] : 11

## 2025-09-04 ENCOUNTER — APPOINTMENT (OUTPATIENT)
Dept: ORTHOPEDIC SURGERY | Facility: CLINIC | Age: 30
End: 2025-09-04
Payer: MEDICAID

## 2025-09-04 VITALS
HEIGHT: 66 IN | BODY MASS INDEX: 30.53 KG/M2 | DIASTOLIC BLOOD PRESSURE: 78 MMHG | HEART RATE: 66 BPM | WEIGHT: 190 LBS | SYSTOLIC BLOOD PRESSURE: 112 MMHG

## 2025-09-04 DIAGNOSIS — S89.92XA UNSPECIFIED INJURY OF LEFT LOWER LEG, INITIAL ENCOUNTER: ICD-10-CM

## 2025-09-04 PROCEDURE — 99213 OFFICE O/P EST LOW 20 MIN: CPT

## 2025-09-04 PROCEDURE — 73562 X-RAY EXAM OF KNEE 3: CPT | Mod: LT
